# Patient Record
Sex: FEMALE | Race: BLACK OR AFRICAN AMERICAN | NOT HISPANIC OR LATINO | Employment: FULL TIME | URBAN - METROPOLITAN AREA
[De-identification: names, ages, dates, MRNs, and addresses within clinical notes are randomized per-mention and may not be internally consistent; named-entity substitution may affect disease eponyms.]

---

## 2021-05-14 ENCOUNTER — OFFICE VISIT (OUTPATIENT)
Dept: PRIMARY CARE | Facility: CLINIC | Age: 31
End: 2021-05-14
Payer: COMMERCIAL

## 2021-05-14 VITALS
HEART RATE: 70 BPM | SYSTOLIC BLOOD PRESSURE: 110 MMHG | WEIGHT: 143 LBS | OXYGEN SATURATION: 98 % | BODY MASS INDEX: 23.82 KG/M2 | DIASTOLIC BLOOD PRESSURE: 72 MMHG | HEIGHT: 65 IN

## 2021-05-14 DIAGNOSIS — Z00.00 ROUTINE MEDICAL EXAM: Primary | ICD-10-CM

## 2021-05-14 DIAGNOSIS — E55.9 VITAMIN D DEFICIENCY: ICD-10-CM

## 2021-05-14 DIAGNOSIS — Z80.3 FAMILY HISTORY OF BREAST CANCER: ICD-10-CM

## 2021-05-14 PROCEDURE — 3008F BODY MASS INDEX DOCD: CPT | Performed by: NURSE PRACTITIONER

## 2021-05-14 PROCEDURE — 99395 PREV VISIT EST AGE 18-39: CPT | Performed by: NURSE PRACTITIONER

## 2021-05-14 RX ORDER — CRANBERRY FRUIT 450 MG
TABLET ORAL
COMMUNITY
End: 2022-09-09 | Stop reason: ALTCHOICE

## 2021-05-14 RX ORDER — ASCORBIC ACID 250 MG
250 TABLET ORAL DAILY
COMMUNITY
End: 2022-09-09 | Stop reason: ALTCHOICE

## 2021-05-14 RX ORDER — LANOLIN ALCOHOL/MO/W.PET/CERES
1000 CREAM (GRAM) TOPICAL DAILY
COMMUNITY
End: 2022-09-09 | Stop reason: ALTCHOICE

## 2021-05-14 ASSESSMENT — ENCOUNTER SYMPTOMS
SHORTNESS OF BREATH: 0
BRUISES/BLEEDS EASILY: 0
NECK PAIN: 0
LIGHT-HEADEDNESS: 0
DYSURIA: 0
ARTHRALGIAS: 0
FEVER: 0
ABDOMINAL PAIN: 0
TROUBLE SWALLOWING: 0
HEADACHES: 0
ADENOPATHY: 0
DIZZINESS: 0
FREQUENCY: 0
PALPITATIONS: 0
BACK PAIN: 0
COUGH: 0
NECK STIFFNESS: 0
FATIGUE: 0
CHILLS: 0

## 2021-05-14 ASSESSMENT — PATIENT HEALTH QUESTIONNAIRE - PHQ9: SUM OF ALL RESPONSES TO PHQ9 QUESTIONS 1 & 2: 0

## 2021-05-14 NOTE — PROGRESS NOTES
Subjective      Patient ID: Lazara Ferguson is a 31 y.o. female.  1990      Patient presents as a new patient for a physical exam.  Diet is healthy, well-balanced, vegetarian diet, but likes seafood.  She is not formally exercising.  Menses is regular.  Up to date with dental and eye exam.  Sleeps well at night.  She works for the [employer] and working from home.  Lives alone.  History of Vitamin D being low.  Offers no complaints .      The following have been reviewed and updated as appropriate in this visit:  Tobacco  Allergies  Meds  Problems  Med Hx  Surg Hx  Fam Hx  Soc Hx        Review of Systems   Constitutional: Negative for chills, fatigue and fever.   HENT: Negative for trouble swallowing.    Respiratory: Negative for cough and shortness of breath.    Cardiovascular: Negative for chest pain, palpitations and leg swelling.   Gastrointestinal: Negative for abdominal pain.   Genitourinary: Negative for dysuria and frequency.   Musculoskeletal: Negative for arthralgias, back pain, neck pain and neck stiffness.   Skin: Negative for rash.   Neurological: Negative for dizziness, light-headedness and headaches (menstrual migraines; takes OTC excedrin ).   Hematological: Negative for adenopathy. Does not bruise/bleed easily.     There is no problem list on file for this patient.     History reviewed. No pertinent past medical history.  Past Surgical History:   Procedure Laterality Date   • BREAST SURGERY      right side benign tumor removed.      Social History     Socioeconomic History   • Marital status: Single     Spouse name: None   • Number of children: None   • Years of education: None   • Highest education level: None   Occupational History   • None   Tobacco Use   • Smoking status: Never Smoker   • Smokeless tobacco: Never Used   Vaping Use   • Vaping Use: Never used   Substance and Sexual Activity   • Alcohol use: Yes     Comment: social    • Drug use: Never   • Sexual activity: Yes  "    Birth control/protection: Pill   Other Topics Concern   • None   Social History Narrative   • None     Social Determinants of Health     Financial Resource Strain:    • Difficulty of Paying Living Expenses:    Food Insecurity:    • Worried About Running Out of Food in the Last Year:    • Ran Out of Food in the Last Year:    Transportation Needs:    • Lack of Transportation (Medical):    • Lack of Transportation (Non-Medical):    Physical Activity:    • Days of Exercise per Week:    • Minutes of Exercise per Session:    Stress:    • Feeling of Stress :    Social Connections:    • Frequency of Communication with Friends and Family:    • Frequency of Social Gatherings with Friends and Family:    • Attends Spiritism Services:    • Active Member of Clubs or Organizations:    • Attends Club or Organization Meetings:    • Marital Status:    Intimate Partner Violence:    • Fear of Current or Ex-Partner:    • Emotionally Abused:    • Physically Abused:    • Sexually Abused:      Objective     Vitals:    05/14/21 1317   BP: 110/72   Pulse: 70   SpO2: 98%   Weight: 64.9 kg (143 lb)   Height: 1.651 m (5' 5\")     Body mass index is 23.8 kg/m².  Current Outpatient Medications   Medication Sig Dispense Refill   • ascorbic acid (VITAMIN C) 250 mg tablet Take 250 mg by mouth daily.     • BIOTIN-KERATIN ORAL Take by mouth.     • cranberry fruit (CRANBERRY) 450 mg tablet Take by mouth.     • cyanocobalamin (VITAMIN B12) 1,000 mcg tablet Take 1,000 mcg by mouth daily.     • ethinyl estradiol/drospirenone (KIMBER, 28, ORAL) Take by mouth.     • Lactobac no.41/Bifidobact no.7 (PROBIOTIC-10 ORAL) Take by mouth.     • multivitamin capsule Take 1 capsule by mouth daily.       No current facility-administered medications for this visit.       Physical Exam  Constitutional:       Appearance: Normal appearance.   HENT:      Head: Normocephalic and atraumatic.      Right Ear: Tympanic membrane, ear canal and external ear normal.      Left Ear: " Tympanic membrane, ear canal and external ear normal.      Nose: Nose normal.      Mouth/Throat:      Pharynx: Oropharynx is clear.   Eyes:      Extraocular Movements: Extraocular movements intact.      Conjunctiva/sclera: Conjunctivae normal.   Cardiovascular:      Rate and Rhythm: Normal rate and regular rhythm.      Pulses: Normal pulses.      Heart sounds: Normal heart sounds.   Pulmonary:      Effort: Pulmonary effort is normal.      Breath sounds: Normal breath sounds.   Abdominal:      General: Bowel sounds are normal.      Palpations: Abdomen is soft.   Musculoskeletal:         General: Normal range of motion.      Cervical back: Normal range of motion and neck supple.   Skin:     General: Skin is warm and dry.   Neurological:      General: No focal deficit present.      Mental Status: She is alert and oriented to person, place, and time.   Psychiatric:         Mood and Affect: Mood normal.         Behavior: Behavior normal.         Thought Content: Thought content normal.         Judgment: Judgment normal.         Assessment/Plan     Problem List Items Addressed This Visit     None      Visit Diagnoses     Routine medical exam    -  Primary    Routine labs ordered.  Continue to work on regular exercise and healthy diet. Up to date with Tdap and pap.    Relevant Orders    Comprehensive metabolic panel    Lipid panel    CBC    TSH w reflex FT4    Vitamin D deficiency        Will check Vitamin D level.    Relevant Orders    Vitamin D 25 hydroxy    Family history of breast cancer        Refer to genetic counseling.

## 2021-06-16 ENCOUNTER — TELEMEDICINE (OUTPATIENT)
Dept: GENETICS | Age: 31
End: 2021-06-16
Payer: COMMERCIAL

## 2021-06-16 DIAGNOSIS — Z80.3 FHX: BREAST CANCER: ICD-10-CM

## 2021-06-16 DIAGNOSIS — Z71.83 ENCOUNTER FOR NONPROCREATIVE GENETIC COUNSELING: Primary | ICD-10-CM

## 2021-06-16 PROCEDURE — 96040 HC GENETICS COUNSELING SESSIONS-TELEHEALTH: CPT | Performed by: GENETIC COUNSELOR, MS

## 2021-06-16 NOTE — LETTER
06/16/21    FRANCA Mcfadden  [address]  [place] [address]      Dear Dr. Hernandez,    I am writing to confirm that your patient, Lazara Ferguson, received care through my office on 06/16/21. I have enclosed a summary of the care provided to Lazara for your reference.    Please contact me with any questions you may have regarding the visit.    Sincerely,           Emmanuelle Garcia, [facility]      CC: No Recipients

## 2021-06-16 NOTE — PROGRESS NOTES
Patient Name:  Lazara Ferguson  : 1990       Telemedicine Consent:    Prior to commencing the session, Lazara Ferguson provided verbal consent to have genetic counseling via telemedicine using Epic Video Client, which is a telemedicine platform being utilized to provide care during the COVID-19 pandemic. Lazara Ferguson understands the session will be billed to insurance or to them directly if uninsured or if not covered by insurance. Lazara Ferguson was informed that the clinician is the only provider on?the video conference, sessions are not recorded by the clinician, and the patient is not permitted to record the session.?The clinician confirmed identification of patient by name and birthdate, confirmed patient location, support person(s) present, and obtained a callback number in case disconnected.     Indication for Appointment:  Lazara Ferguson presented for genetic counseling and cancer risk assessment via a telemedicine encounter due to a family history of breast cancer. Lazara Ferguson was referred by FRANCA Bradford and presented to the session alone.    Personal History:   Lazara Ferguson is a 31 y.o. female with primary visit diagnosis of Encounter for nonprocreative genetic counseling [Z71.83]. She is of [place] descent; of note, she reports Ashkenazi Religion ancestry in her maternal grandmother's lineage.    Past Medical History:   Diagnosis Date   • Screening for breast cancer     breast ultrasound in  due to mass in right breast      Past Medical History Pertinent Negatives:   Denies History Of: Date Noted   • Disease of thyroid gland 2021   • Fibrocystic breast 2021   • Fibroid 2021   • Screening for colon cancer 2021     Past Surgical History:   Procedure Laterality Date   • Breast biopsy Right     benign per patient report   • Breast surgery Right [id number]    right side benign tumor removed (pathology unavailable for review)   •  "Cervical biopsy  w/ loop electrode excision      due to abnormal PAP     Past Surgical History Pertinent Negatives:   Denies History Of: Date Noted   • Colonoscopy 06/16/2021   • Hysterectomy 06/16/2021   • Oophorectomy 06/16/2021   • Salpingectomy 06/16/2021      Height/Weight: (previously recorded in physician's office)  Height: 1.651 m (5' 5\")  Weight: 64.9 kg (143 lb)    Gynecologic History:  Menarche Age: 9 years  Age at first live birth: Nulliparous  Menopause Status: Pre-Menopause  Use of hormonal contraceptives: Yes (BCPs 2007-present)  Use of fertility medications: No  Use of hormone replacement therapy: No  Use of Tamoxifen/Evista: No    Social History     Tobacco Use   • Smoking status: Never Smoker   • Smokeless tobacco: Never Used   Vaping Use   • Vaping Use: Never used   Substance Use Topics   • Alcohol use: Yes     Comment: social    • Drug use: Never       Family History:  See completed family history in pedigree below.    Genetic Education/Risk Assessment/Counseling:  Information was provided about the relationship between genes and cancer.  The concept of hereditary cancer was defined.  Natural history, risks and inheritance patterns of  cancer-associated genes were reviewed, as related to Lazara Ferguson’s personal and/or family history.  Related psychosocial aspects were discussed.    Discussion of Genetic Testing:  The pros, cons, and limitations of testing for genetic susceptibility were discussed, including but not limited to test options, possible results, potential impact on management, and psychosocial aspects.  There may be limited data on the degree of cancer risk and/or no defined management guidelines associated with some genes.  If applicable, risk assessment models and/or published tables were used to provide a mutation probability estimate. Limitations of assessment were reviewed.    Given the reported personal and/or family history, genetic testing was offered but declined at this " time. Lazara Ferguson does not meet insurance or national criteria for testing, and she declined the $250 self-pay peralta. Lazara Ferguson is planning on asking her mother if she ever underwent genetic testing/if she would be interested in testing first.    Risk Assessment and Management:     In the absence of genetic testing, the cancer risks and guidelines reviewed are based on the personal and/or family history provided. As guidelines continually change and the efficacy of screening for some cancers remains under investigation, Lazara Ferguson is encouraged to review personal and family history with managing physician(s) regularly.  Site of Surveillence Coordinating Provider Recommendation Status   Breast Breast Specialist, Primary Care or Gynecology · Lifetime risk of developing breast cancer was calculated using the Tyrer-Cuzick model and is estimated to be 36.3%.  · Breast cancer risk is dynamic and can increase with age and other factors. A high lifetime risk for developing breast cancer is typically 20-25% or higher.  National Comprehensive Cancer Network (NCCN) guidelines for breast cancer screening include: monthly self-breast examinations, clinical breast examination performed by a physician every 6-12 months and annual mammogram to begin 10 years prior to the youngest breast cancer diagnosis in the family, but not less than age 30. The NCCN also suggests screening with breast MRI as an adjunct to mammogram in the high risk setting, with breast MRI screening beginning 10 years prior to the youngest breast cancer diagnosis in the family, but not less than age 25. As the lifetime risk for breast cancer exceeds 20%, screening, as well as the risks and benefits of risk reduction strategies should be discussed with managing physician(s).   Baseline mammogram has not yet been completed.   Gynecologic Gynecology · Pelvic exam and pap test annually or as directed by physician.       Gastrointestinal  PCP  Gastroenterology · Guidelines for when to begin colorectal cancer screening in average risk individuals vary between age 45 and 50; thus, an appropriate screening plan based on personal factors, such as age, race, and symptoms, as well as family history, should be determined by screening gastroenterologist.    Baseline colonoscopy has not yet been completed.   Skin PCP  Dermatology · Lazaraheath Ferguson is encouraged to practice skin protective behaviors, such as:  · Limit direct sun exposure  · Use sunscreen  · Pursue annual skin screening by a physician         General Management PCP · Annual physical examination is encouraged.  · Adherence to a healthy lifestyle, including body mass index (BMI) <25 obtained through balanced diet and exercise  · Limit intake of alcoholic beverages to less than 1 drink per day (serving equals 1 ounce of liquor, 6 ounces of wine or 8 ounces of beer)  · Not smoking.        Plan:  Cancer risks are based on personal history, as well as on maternal and paternal family histories, mutation status, and other factors.  Relatives are encouraged to consider risk assessment and/or genetic evaluation to derive a risk-appropriate, individualized plan.  Should family member(s) be interested in genetic evaluation, the Cancer Risk Assessment and Genetics Program can provide consultation or help to find a genetic counselor in their area. Testing in relatives may assist in cancer risk assessment.    The information provided reflects current practice guidelines and may change with new medical discoveries/technology/updated personal or family history information.  Lazara Ferguson was confirmed to have understood the aforementioned information and was assisted with decision making as needed.  Informational and supportive resources were provided. Consent was obtained to share chart note(s) with physicians.  Lazara Ferguson plans to discuss the above information with physicians to determine an optimal  risk management plan.    Lazara Ferguson should contact the program with personal/family history updates as this could alter the guidelines provided and/or available test options and/or to inquire about new information specific to this case.  Lazara Ferguson was encouraged to contact the genetics program at [phone] with any questions or concerns and/or to periodically review test options and related insurance coverage.    A total of 30 minutes was spent providing genetic counseling to Lazara Ferguson.

## 2021-06-16 NOTE — LETTER
06/16/21    Lazara Dumontan  [address] [place] [address]    Dear Ms. Ferguson,    Thank you for participating in the [facility]etics Program.  It was a pleasure working with you. Attached is documentation from our discussion(s). It will also be sent to any physicians you indicated.      You may also choose to share this documentation with your family members. Because cancer risk is based on both personal and both maternal and paternal family history factors, as well as mutation status, your relatives are recommended to review their risks and genetic testing options (if applicable) with their own healthcare providers to derive a risk-appropriate, individualized plan.      If you have any questions, concerns, or updates to your personal/family history, please contact the [facility] Risk Assessment and Genetics Program at [phone] to further review your case.    Please see below for your encounter report.    Sincerely,         Emmanuelle Garcia, [facility]        Encounter Note    Telemedicine Consent:    Prior to commencing the session, Lazara Ferguson provided verbal consent to have genetic counseling via telemedicine using Epic Video Client, which is a telemedicine platform being utilized to provide care during the COVID-19 pandemic. Lazara Ferguson understands the session will be billed to insurance or to them directly if uninsured or if not covered by insurance. Lazara Ferguson was informed that the clinician is the only provider on?the video conference, sessions are not recorded by the clinician, and the patient is not permitted to record the session.?The clinician confirmed identification of patient by name and birthdate, confirmed patient location, support person(s) present, and obtained a callback number in case disconnected.     Indication for Appointment:  Lazara Ferguson presented for genetic counseling and cancer risk assessment via a telemedicine encounter  "due to a family history of breast cancer. Lazara Ferguson was referred by FRANCA Bradford and presented to the session alone.    Personal History:   Lazara Ferguson is a 31 y.o. female with primary visit diagnosis of Encounter for nonprocreative genetic counseling [Z71.83]. She is of [place] descent; of note, she reports Ashkenazi [employer] ancestry in her maternal grandmother's lineage.    Past Medical History:   Diagnosis Date   • Screening for breast cancer     breast ultrasound in 2007/2008 due to mass in right breast      Past Medical History Pertinent Negatives:   Denies History Of: Date Noted   • Disease of thyroid gland 06/16/2021   • Fibrocystic breast 06/16/2021   • Fibroid 06/16/2021   • Screening for colon cancer 06/16/2021     Past Surgical History:   Procedure Laterality Date   • Breast biopsy Right 2007/2008    benign per patient report   • Breast surgery Right [id number]    right side benign tumor removed (pathology unavailable for review)   • Cervical biopsy  w/ loop electrode excision      due to abnormal PAP     Past Surgical History Pertinent Negatives:   Denies History Of: Date Noted   • Colonoscopy 06/16/2021   • Hysterectomy 06/16/2021   • Oophorectomy 06/16/2021   • Salpingectomy 06/16/2021      Height/Weight: (previously recorded in physician's office)  Height: 1.651 m (5' 5\")  Weight: 64.9 kg (143 lb)    Gynecologic History:  Menarche Age: 9 years  Age at first live birth: Nulliparous  Menopause Status: Pre-Menopause  Use of hormonal contraceptives: Yes (BCPs 2007-present)  Use of fertility medications: No  Use of hormone replacement therapy: No  Use of Tamoxifen/Evista: No    Social History     Tobacco Use   • Smoking status: Never Smoker   • Smokeless tobacco: Never Used   Vaping Use   • Vaping Use: Never used   Substance Use Topics   • Alcohol use: Yes     Comment: social    • Drug use: Never       Family History:  See completed family history in pedigree below.    Genetic " Education/Risk Assessment/Counseling:  Information was provided about the relationship between genes and cancer.  The concept of hereditary cancer was defined.  Natural history, risks and inheritance patterns of  cancer-associated genes were reviewed, as related to Lazara Ferguson’s personal and/or family history.  Related psychosocial aspects were discussed.    Discussion of Genetic Testing:  The pros, cons, and limitations of testing for genetic susceptibility were discussed, including but not limited to test options, possible results, potential impact on management, and psychosocial aspects.  There may be limited data on the degree of cancer risk and/or no defined management guidelines associated with some genes.  If applicable, risk assessment models and/or published tables were used to provide a mutation probability estimate. Limitations of assessment were reviewed.    Given the reported personal and/or family history, genetic testing was offered but declined at this time. Lazara Ferguson does not meet insurance or national criteria for testing, and she declined the $250 self-pay peralta. Lazara Ferguson is planning on asking her mother if she ever underwent genetic testing/if she would be interested in testing first.    Risk Assessment and Management:     In the absence of genetic testing, the cancer risks and guidelines reviewed are based on the personal and/or family history provided. As guidelines continually change and the efficacy of screening for some cancers remains under investigation, Lazara Ferguson is encouraged to review personal and family history with managing physician(s) regularly.  Site of Surveillence Coordinating Provider Recommendation Status   Breast Breast Specialist, Primary Care or Gynecology · Lifetime risk of developing breast cancer was calculated using the Tyrer-Cuzick model and is estimated to be 36.3%.  · Breast cancer risk is dynamic and can increase with age and other factors. A  high lifetime risk for developing breast cancer is typically 20-25% or higher.  National Comprehensive Cancer Network (NCCN) guidelines for breast cancer screening include: monthly self-breast examinations, clinical breast examination performed by a physician every 6-12 months and annual mammogram to begin 10 years prior to the youngest breast cancer diagnosis in the family, but not less than age 30. The NCCN also suggests screening with breast MRI as an adjunct to mammogram in the high risk setting, with breast MRI screening beginning 10 years prior to the youngest breast cancer diagnosis in the family, but not less than age 25. As the lifetime risk for breast cancer exceeds 20%, screening, as well as the risks and benefits of risk reduction strategies should be discussed with managing physician(s).   Baseline mammogram has not yet been completed.   Gynecologic Gynecology · Pelvic exam and pap test annually or as directed by physician.       Gastrointestinal PCP  Gastroenterology · Guidelines for when to begin colorectal cancer screening in average risk individuals vary between age 45 and 50; thus, an appropriate screening plan based on personal factors, such as age, race, and symptoms, as well as family history, should be determined by screening gastroenterologist.    Baseline colonoscopy has not yet been completed.   Skin PCP  Dermatology · Lazara Ferguson is encouraged to practice skin protective behaviors, such as:  · Limit direct sun exposure  · Use sunscreen  · Pursue annual skin screening by a physician         General Management PCP · Annual physical examination is encouraged.  · Adherence to a healthy lifestyle, including body mass index (BMI) <25 obtained through balanced diet and exercise  · Limit intake of alcoholic beverages to less than 1 drink per day (serving equals 1 ounce of liquor, 6 ounces of wine or 8 ounces of beer)  · Not smoking.        Plan:  Cancer risks are based on personal history, as  well as on maternal and paternal family histories, mutation status, and other factors.  Relatives are encouraged to consider risk assessment and/or genetic evaluation to derive a risk-appropriate, individualized plan.  Should family member(s) be interested in genetic evaluation, the Cancer Risk Assessment and Genetics Program can provide consultation or help to find a genetic counselor in their area. Testing in relatives may assist in cancer risk assessment.    The information provided reflects current practice guidelines and may change with new medical discoveries/technology/updated personal or family history information.  Lazara Ferguson was confirmed to have understood the aforementioned information and was assisted with decision making as needed.  Informational and supportive resources were provided. Consent was obtained to share chart note(s) with physicians.  Lazara Ferguson plans to discuss the above information with physicians to determine an optimal risk management plan.    Lazara Ferguson should contact the program with personal/family history updates as this could alter the guidelines provided and/or available test options and/or to inquire about new information specific to this case.  Lazara Ferguson was encouraged to contact the genetics program at [phone] with any questions or concerns and/or to periodically review test options and related insurance coverage.    A total of 30 minutes was spent providing genetic counseling to Lazara Ferguson.

## 2021-08-16 ENCOUNTER — TELEPHONE (OUTPATIENT)
Dept: PRIMARY CARE | Facility: CLINIC | Age: 31
End: 2021-08-16

## 2021-08-16 NOTE — TELEPHONE ENCOUNTER
Spoke to pt over the phone, pt states her test is scheduled today for 345pm and she will have them send the results here so that we are able to view them. Pt states she is still having symptoms so she assumes test will be positive. Advised to monitor symptoms from home and if they get worse contact office. Pt understands

## 2021-08-16 NOTE — TELEPHONE ENCOUNTER
Patient called to advise she was recently exposed to someone who tested COVID +.  She is being tested today.  She has been having body aches , cough , and chills since yesterday.  She would like a call back to discuss what her next steps should be.

## 2021-08-19 NOTE — TELEPHONE ENCOUNTER
Patient just got +COVID results today.  Some of her symptoms have subsided but not the cough and shortness of breath.  States it is not an emergency but when she comes in from walking the dog she has a hard time breathing also at night. She is requesting a call back.

## 2021-08-19 NOTE — TELEPHONE ENCOUNTER
Spoke to Lazara - she states symtpoms are improving but she has intermittent shortness of breath. I recommended she try to purchase a pulse ox and monitor her oxygen level. If O2 is < 92% she is to go to the ER. She is monitoring her temperature - 99.4F today. She is taking Robitussin OTC which is helping with the cough. I advised her if shortness of breath worsens to go to the ER to r/o pneumonia. Pt. Verbalized understanding and all questions were answered.  I asked her call the office tomorrow with an update on how she is feeling.

## 2021-08-23 NOTE — TELEPHONE ENCOUNTER
Talked to patient and she is doing much better. Shortness of breath and fevers was resolved. Patient only continues with dry cough. Patient continues to monitor and call office if worsening symptoms

## 2021-09-10 ENCOUNTER — OFFICE VISIT (OUTPATIENT)
Dept: PRIMARY CARE | Facility: CLINIC | Age: 31
End: 2021-09-10
Payer: COMMERCIAL

## 2021-09-10 VITALS
DIASTOLIC BLOOD PRESSURE: 80 MMHG | TEMPERATURE: 97.9 F | RESPIRATION RATE: 16 BRPM | OXYGEN SATURATION: 99 % | WEIGHT: 140.2 LBS | BODY MASS INDEX: 23.36 KG/M2 | HEIGHT: 65 IN | SYSTOLIC BLOOD PRESSURE: 128 MMHG | HEART RATE: 81 BPM

## 2021-09-10 DIAGNOSIS — R25.3 EYELID TWITCH: ICD-10-CM

## 2021-09-10 DIAGNOSIS — R05.9 COUGH: ICD-10-CM

## 2021-09-10 DIAGNOSIS — U07.1 COVID-19: Primary | ICD-10-CM

## 2021-09-10 PROCEDURE — 3008F BODY MASS INDEX DOCD: CPT | Performed by: NURSE PRACTITIONER

## 2021-09-10 PROCEDURE — 99213 OFFICE O/P EST LOW 20 MIN: CPT | Performed by: NURSE PRACTITIONER

## 2021-09-10 RX ORDER — ALBUTEROL SULFATE 90 UG/1
INHALANT RESPIRATORY (INHALATION)
Qty: 1 EACH | Refills: 2 | Status: SHIPPED | OUTPATIENT
Start: 2021-09-10 | End: 2023-11-07 | Stop reason: ALTCHOICE

## 2021-09-10 RX ORDER — METHYLPREDNISOLONE 4 MG/1
TABLET ORAL
Qty: 21 TABLET | Refills: 0 | Status: SHIPPED | OUTPATIENT
Start: 2021-09-10 | End: 2021-09-17

## 2021-09-10 RX ORDER — DEXAMETHASONE 4 MG/1
1 TABLET ORAL 2 TIMES DAILY
Qty: 12 G | Refills: 5 | Status: SHIPPED | OUTPATIENT
Start: 2021-09-10 | End: 2023-04-24 | Stop reason: ALTCHOICE

## 2021-09-10 ASSESSMENT — ENCOUNTER SYMPTOMS
HEADACHES: 0
SHORTNESS OF BREATH: 0
COUGH: 1
DIZZINESS: 0
FATIGUE: 0
FEVER: 0

## 2021-09-10 NOTE — PATIENT INSTRUCTIONS
Patient present with cough post covid. Will started Flovent inhaler and medrol dose marlee prescribed. Advised to follow up as needed. Advised patient that eye twicthing is probably related to lack of sleep. Follow up as needed.

## 2021-09-10 NOTE — PROGRESS NOTES
"      Subjective      Patient ID: Lazara Ferguson is a 31 y.o. female.  1990      Covid positive 8/16. Started with chills, and progressed to fevers, vomiting and body aches. Cough started the second week of Covid. States cough has been dry. No relief with robutussin or cough drops.  Eye twitching with right eye started with Covid and has continued.      The following have been reviewed and updated as appropriate in this visit:  Tobacco  Allergies  Meds  Problems  Med Hx  Surg Hx  Fam Hx       Review of Systems   Constitutional: Negative for fatigue and fever.   HENT: Negative for congestion.    Respiratory: Positive for cough. Negative for shortness of breath.    Cardiovascular: Negative for chest pain.   Neurological: Negative for dizziness and headaches.       Objective     Vitals:    09/10/21 1509   BP: 128/80   BP Location: Left upper arm   Patient Position: Sitting   Pulse: 81   Resp: 16   Temp: 36.6 °C (97.9 °F)   TempSrc: Temporal   SpO2: 99%   Weight: 63.6 kg (140 lb 3.2 oz)   Height: 1.651 m (5' 5\")     Body mass index is 23.33 kg/m².    Physical Exam  Vitals and nursing note reviewed.   Constitutional:       General: She is not in acute distress.     Appearance: She is well-developed. She is not diaphoretic.   HENT:      Right Ear: Hearing, tympanic membrane, ear canal and external ear normal.      Left Ear: Hearing, tympanic membrane, ear canal and external ear normal.      Nose: Mucosal edema present. No rhinorrhea.      Right Sinus: No maxillary sinus tenderness or frontal sinus tenderness.      Left Sinus: No maxillary sinus tenderness or frontal sinus tenderness.      Mouth/Throat:      Pharynx: Uvula midline.      Tonsils: No tonsillar exudate. 0 on the right. 0 on the left.   Cardiovascular:      Rate and Rhythm: Normal rate and regular rhythm.      Heart sounds: Normal heart sounds. No murmur heard.   No friction rub. No gallop.    Pulmonary:      Effort: Pulmonary effort is normal. No " respiratory distress.      Breath sounds: Normal breath sounds. No stridor. No decreased breath sounds, wheezing, rhonchi or rales.   Lymphadenopathy:      Head:      Right side of head: No submental, submandibular, tonsillar, preauricular or posterior auricular adenopathy.      Left side of head: No submental, submandibular, tonsillar, preauricular or posterior auricular adenopathy.      Cervical: No cervical adenopathy.   Neurological:      Mental Status: She is alert.         Assessment/Plan   Diagnoses and all orders for this visit:    COVID-19 (Primary)    Cough  -     methylPREDNISolone (MEDROL DOSEPACK) 4 mg tablet; Follow package directions.  -     fluticasone HFA (FLOVENT HFA) 110 mcg/actuation inhaler; Inhale 1 puff 2 (two) times a day. Rinse mouth with water after use to reduce aftertaste and incidence of candidiasis. Do not swallow. For patients not on a ventilator, a spacer is recommended to be used with this medication/inhaler.  -     albuterol HFA (VENTOLIN HFA) 90 mcg/actuation inhaler; Take 1-2 puffs by mouth every 4-6 hours as needed for wheezing and/or SOB    Eyelid twitch      Patient Instructions   Patient present with cough post covid. Will started Flovent inhaler and medrol dose marlee prescribed. Advised to follow up as needed. Advised patient that eye twicthing is probably related to lack of sleep. Follow up as needed.

## 2022-09-09 ENCOUNTER — OFFICE VISIT (OUTPATIENT)
Dept: PRIMARY CARE | Facility: CLINIC | Age: 32
End: 2022-09-09
Payer: COMMERCIAL

## 2022-09-09 VITALS
SYSTOLIC BLOOD PRESSURE: 132 MMHG | HEART RATE: 70 BPM | DIASTOLIC BLOOD PRESSURE: 80 MMHG | WEIGHT: 144.8 LBS | TEMPERATURE: 98.3 F | OXYGEN SATURATION: 98 % | RESPIRATION RATE: 15 BRPM | BODY MASS INDEX: 24.12 KG/M2 | HEIGHT: 65 IN

## 2022-09-09 DIAGNOSIS — R53.83 FATIGUE, UNSPECIFIED TYPE: ICD-10-CM

## 2022-09-09 DIAGNOSIS — R06.09 DYSPNEA ON EXERTION: ICD-10-CM

## 2022-09-09 DIAGNOSIS — Z00.00 ROUTINE MEDICAL EXAM: Primary | ICD-10-CM

## 2022-09-09 DIAGNOSIS — Z23 NEED FOR VACCINATION: ICD-10-CM

## 2022-09-09 DIAGNOSIS — R35.0 URINARY FREQUENCY: ICD-10-CM

## 2022-09-09 PROBLEM — U07.1 COVID-19: Status: RESOLVED | Noted: 2021-09-10 | Resolved: 2022-09-09

## 2022-09-09 PROCEDURE — 3008F BODY MASS INDEX DOCD: CPT | Performed by: NURSE PRACTITIONER

## 2022-09-09 PROCEDURE — 99395 PREV VISIT EST AGE 18-39: CPT | Mod: 25 | Performed by: NURSE PRACTITIONER

## 2022-09-09 PROCEDURE — 90471 IMMUNIZATION ADMIN: CPT | Performed by: NURSE PRACTITIONER

## 2022-09-09 PROCEDURE — 90686 IIV4 VACC NO PRSV 0.5 ML IM: CPT | Performed by: NURSE PRACTITIONER

## 2022-09-09 ASSESSMENT — ENCOUNTER SYMPTOMS
BRUISES/BLEEDS EASILY: 0
ARTHRALGIAS: 0
BACK PAIN: 0
FATIGUE: 1
ADENOPATHY: 0
DIZZINESS: 0
NECK STIFFNESS: 0
LIGHT-HEADEDNESS: 0
FREQUENCY: 1
SHORTNESS OF BREATH: 1
CHILLS: 0
TROUBLE SWALLOWING: 0
ABDOMINAL PAIN: 0
PALPITATIONS: 0
NUMBNESS: 0
NECK PAIN: 0
FEVER: 0

## 2022-09-09 ASSESSMENT — PATIENT HEALTH QUESTIONNAIRE - PHQ9: SUM OF ALL RESPONSES TO PHQ9 QUESTIONS 1 & 2: 0

## 2022-09-09 NOTE — PROGRESS NOTES
Subjective      Patient ID: Lazara Ferguson is a 32 y.o. female.  1990      Patient presents for a phyiscal exam.  Diet is healthy, well-balanced, plant based.  She is active going to the gym.  Up to date with dental and eye exam.  Menses is regular, doing well on OCP.  Sees [facility], sees Dr. Elias.  Sleeps well overall.  She is still working from home for the [employer].     She has been noticed increased urinary frequency and episodes of low blood sugar after exercising or prolonged standing.     She also still has some dyspnea on exertion requiring use of flovent 1-2 times a  Week after having COVID last year.  Notices mostly when climbing the steps.       The following have been reviewed and updated as appropriate in this visit:   Tobacco  Allergies  Meds  Problems  Med Hx  Surg Hx  Fam Hx  Soc   Hx        Review of Systems   Constitutional: Positive for fatigue. Negative for chills and fever.   HENT: Negative for trouble swallowing.    Respiratory: Positive for shortness of breath.    Cardiovascular: Negative for chest pain, palpitations and leg swelling.   Gastrointestinal: Negative for abdominal pain.   Genitourinary: Positive for frequency.   Musculoskeletal: Negative for arthralgias, back pain, neck pain and neck stiffness.   Skin: Negative for rash.   Neurological: Negative for dizziness, light-headedness and numbness.   Hematological: Negative for adenopathy. Does not bruise/bleed easily.     Patient Active Problem List   Diagnosis   (none) - all problems resolved or deleted      Past Medical History:   Diagnosis Date    Screening for breast cancer     breast ultrasound in 2007/2008 due to mass in right breast     Past Surgical History:   Procedure Laterality Date    BREAST BIOPSY Right 2007/2008    benign per patient report    BREAST SURGERY Right [id number]    right side benign tumor removed (pathology unavailable for review)    CERVICAL BIOPSY  W/ LOOP ELECTRODE  "EXCISION      due to abnormal PAP     Social History     Socioeconomic History    Marital status: Single     Spouse name: None    Number of children: None    Years of education: None    Highest education level: None   Tobacco Use    Smoking status: Never Smoker    Smokeless tobacco: Never Used   Vaping Use    Vaping Use: Never used   Substance and Sexual Activity    Alcohol use: Yes     Comment: social     Drug use: Never    Sexual activity: Yes     Birth control/protection: Pill     Objective     Vitals:    09/09/22 0840   BP: 132/80   Pulse: 70   Resp: 15   Temp: 36.8 °C (98.3 °F)   SpO2: 98%   Weight: 65.7 kg (144 lb 12.8 oz)   Height: 1.651 m (5' 5\")     Body mass index is 24.1 kg/m².  Current Outpatient Medications   Medication Sig Dispense Refill    albuterol HFA (VENTOLIN HFA) 90 mcg/actuation inhaler Take 1-2 puffs by mouth every 4-6 hours as needed for wheezing and/or SOB 1 each 2    ethinyl estradiol/drospirenone (KIMBER, 28, ORAL) Take by mouth.      fluticasone HFA (FLOVENT HFA) 110 mcg/actuation inhaler Inhale 1 puff 2 (two) times a day. Rinse mouth with water after use to reduce aftertaste and incidence of candidiasis. Do not swallow. For patients not on a ventilator, a spacer is recommended to be used with this medication/inhaler. 12 g 5     No current facility-administered medications for this visit.       Physical Exam  Constitutional:       Appearance: Normal appearance.   HENT:      Head: Normocephalic and atraumatic.      Right Ear: Tympanic membrane, ear canal and external ear normal.      Left Ear: Tympanic membrane, ear canal and external ear normal.      Nose: Nose normal.      Mouth/Throat:      Pharynx: Oropharynx is clear.   Eyes:      Extraocular Movements: Extraocular movements intact.      Conjunctiva/sclera: Conjunctivae normal.   Cardiovascular:      Rate and Rhythm: Normal rate and regular rhythm.      Pulses: Normal pulses.      Heart sounds: Normal heart sounds. "   Pulmonary:      Effort: Pulmonary effort is normal.      Breath sounds: Normal breath sounds.   Abdominal:      General: Bowel sounds are normal.      Palpations: Abdomen is soft.      Tenderness: There is no abdominal tenderness.   Musculoskeletal:         General: Normal range of motion.      Cervical back: Normal range of motion and neck supple.   Skin:     General: Skin is warm and dry.   Neurological:      General: No focal deficit present.      Mental Status: She is alert and oriented to person, place, and time.   Psychiatric:         Mood and Affect: Mood normal.         Behavior: Behavior normal.         Thought Content: Thought content normal.         Judgment: Judgment normal.         Assessment/Plan     Problem List Items Addressed This Visit    None     Visit Diagnoses     Routine medical exam    -  Primary    Continue healthy diet and regular exercise. Routine labs ordered.     Relevant Orders    Comprehensive metabolic panel    Lipid panel    CBC    TSH w reflex FT4    Hemoglobin A1c    Influenza vaccine quadrivalent preservative free 6 mon and older IM (FluLaval) (Completed)    Urinary frequency        Relevant Orders    Hemoglobin A1c    Urinalysis with Reflex Culture    Fatigue, unspecified type        Relevant Orders    Vitamin D 25 hydroxy    Vitamin B12    Need for vaccination        Flu vaccine administered.     Relevant Orders    Influenza vaccine quadrivalent preservative free 6 mon and older IM (FluLaval) (Completed)    Dyspnea on exertion        Given ongoing dyspnea on exertion after COVID, recommend pulmonary evaluation .

## 2022-09-13 ENCOUNTER — TELEPHONE (OUTPATIENT)
Dept: PRIMARY CARE | Facility: CLINIC | Age: 32
End: 2022-09-13

## 2022-09-13 LAB
25(OH)D3+25(OH)D2 SERPL-MCNC: 38.3 NG/ML (ref 30–100)
ALBUMIN SERPL-MCNC: 4.4 G/DL (ref 3.8–4.8)
ALBUMIN/GLOB SERPL: 1.4 {RATIO} (ref 1.2–2.2)
ALP SERPL-CCNC: 57 IU/L (ref 44–121)
ALT SERPL-CCNC: 11 IU/L (ref 0–32)
AST SERPL-CCNC: 25 IU/L (ref 0–40)
BILIRUB SERPL-MCNC: 0.3 MG/DL (ref 0–1.2)
BUN SERPL-MCNC: 8 MG/DL (ref 6–20)
BUN/CREAT SERPL: 10 (ref 9–23)
CALCIUM SERPL-MCNC: 9.6 MG/DL (ref 8.7–10.2)
CHLORIDE SERPL-SCNC: 103 MMOL/L (ref 96–106)
CHOLEST SERPL-MCNC: 186 MG/DL (ref 100–199)
CO2 SERPL-SCNC: 23 MMOL/L (ref 20–29)
CREAT SERPL-MCNC: 0.82 MG/DL (ref 0.57–1)
EGFRCR-CYS SERPLBLD CKD-EPI 2021: 96 ML/MIN/1.73
ERYTHROCYTE [DISTWIDTH] IN BLOOD BY AUTOMATED COUNT: 12.1 % (ref 11.7–15.4)
GLOBULIN SER CALC-MCNC: 3.1 G/DL (ref 1.5–4.5)
GLUCOSE SERPL-MCNC: 94 MG/DL (ref 65–99)
HBA1C MFR BLD: 5.3 % (ref 4.8–5.6)
HCT VFR BLD AUTO: 35.5 % (ref 34–46.6)
HDLC SERPL-MCNC: 55 MG/DL
HGB BLD-MCNC: 12.2 G/DL (ref 11.1–15.9)
LDLC SERPL CALC-MCNC: 110 MG/DL (ref 0–99)
MCH RBC QN AUTO: 30.7 PG (ref 26.6–33)
MCHC RBC AUTO-ENTMCNC: 34.4 G/DL (ref 31.5–35.7)
MCV RBC AUTO: 89 FL (ref 79–97)
PLATELET # BLD AUTO: 293 X10E3/UL (ref 150–450)
POTASSIUM SERPL-SCNC: 4.5 MMOL/L (ref 3.5–5.2)
PROT SERPL-MCNC: 7.5 G/DL (ref 6–8.5)
RBC # BLD AUTO: 3.98 X10E6/UL (ref 3.77–5.28)
SODIUM SERPL-SCNC: 139 MMOL/L (ref 134–144)
T4 FREE SERPL-MCNC: 1.05 NG/DL (ref 0.82–1.77)
TRIGL SERPL-MCNC: 118 MG/DL (ref 0–149)
TSH SERPL DL<=0.005 MIU/L-ACNC: 0.67 UIU/ML (ref 0.45–4.5)
VIT B12 SERPL-MCNC: 432 PG/ML (ref 232–1245)
VLDLC SERPL CALC-MCNC: 21 MG/DL (ref 5–40)
WBC # BLD AUTO: 3.4 X10E3/UL (ref 3.4–10.8)

## 2022-09-13 NOTE — TELEPHONE ENCOUNTER
Test Result Request  Patient PCP: Adenike Hrenandez CRNPOrdering Provider: Mary SCHULTE  Test Name: annual blood work  Testing Location: [facility]  Test Date: 9/12    Test results may have been made available for review in [employer] before your provider has had a chance to review and discuss the results with you. Please allow time for your provider to review your results.

## 2022-09-14 LAB
APPEARANCE UR: CLEAR
BACTERIA #/AREA URNS HPF: ABNORMAL /[HPF]
BACTERIA UR CULT: NO GROWTH
BACTERIA UR CULT: NORMAL
BILIRUB UR QL STRIP: NEGATIVE
CASTS URNS QL MICRO: ABNORMAL /LPF
COLOR UR: YELLOW
EPI CELLS #/AREA URNS HPF: >10 /HPF (ref 0–10)
GLUCOSE UR QL STRIP: NEGATIVE
HGB UR QL STRIP: NEGATIVE
KETONES UR QL STRIP: ABNORMAL
LAB CORP URINALYSIS REFLEX: ABNORMAL
LEUKOCYTE ESTERASE UR QL STRIP: NEGATIVE
MICRO URNS: ABNORMAL
MICRO URNS: ABNORMAL
NITRITE UR QL STRIP: NEGATIVE
PH UR STRIP: 5.5 [PH] (ref 5–7.5)
PROT UR QL STRIP: ABNORMAL
RBC #/AREA URNS HPF: ABNORMAL /HPF (ref 0–2)
SP GR UR STRIP: 1.03 (ref 1–1.03)
UROBILINOGEN UR STRIP-MCNC: 0.2 MG/DL (ref 0.2–1)
WBC #/AREA URNS HPF: >30 /HPF (ref 0–5)

## 2023-04-24 ENCOUNTER — TELEPHONE (OUTPATIENT)
Dept: PRIMARY CARE | Facility: CLINIC | Age: 33
End: 2023-04-24

## 2023-04-24 ENCOUNTER — OFFICE VISIT (OUTPATIENT)
Dept: PRIMARY CARE | Facility: CLINIC | Age: 33
End: 2023-04-24
Payer: COMMERCIAL

## 2023-04-24 ENCOUNTER — HOSPITAL ENCOUNTER (OUTPATIENT)
Dept: RADIOLOGY | Facility: CLINIC | Age: 33
Discharge: HOME | End: 2023-04-24
Attending: NURSE PRACTITIONER
Payer: COMMERCIAL

## 2023-04-24 VITALS
DIASTOLIC BLOOD PRESSURE: 70 MMHG | WEIGHT: 146.2 LBS | HEIGHT: 65 IN | SYSTOLIC BLOOD PRESSURE: 114 MMHG | OXYGEN SATURATION: 99 % | BODY MASS INDEX: 24.36 KG/M2 | TEMPERATURE: 97.3 F | RESPIRATION RATE: 18 BRPM | HEART RATE: 66 BPM

## 2023-04-24 DIAGNOSIS — M25.561 ACUTE PAIN OF RIGHT KNEE: ICD-10-CM

## 2023-04-24 DIAGNOSIS — M25.561 ACUTE PAIN OF RIGHT KNEE: Primary | ICD-10-CM

## 2023-04-24 PROCEDURE — 3008F BODY MASS INDEX DOCD: CPT | Performed by: NURSE PRACTITIONER

## 2023-04-24 PROCEDURE — 73564 X-RAY EXAM KNEE 4 OR MORE: CPT | Mod: RT

## 2023-04-24 PROCEDURE — 99213 OFFICE O/P EST LOW 20 MIN: CPT | Performed by: NURSE PRACTITIONER

## 2023-04-24 RX ORDER — NAPROXEN 500 MG/1
500 TABLET ORAL 2 TIMES DAILY WITH MEALS
Qty: 30 TABLET | Refills: 0 | Status: SHIPPED | OUTPATIENT
Start: 2023-04-24 | End: 2023-11-07 | Stop reason: ALTCHOICE

## 2023-04-24 ASSESSMENT — ENCOUNTER SYMPTOMS
FEVER: 0
DIZZINESS: 0
DIAPHORESIS: 0
TROUBLE SWALLOWING: 0
BRUISES/BLEEDS EASILY: 0
LIGHT-HEADEDNESS: 0
COUGH: 0
HEADACHES: 0
SHORTNESS OF BREATH: 0
PALPITATIONS: 0
FATIGUE: 0
ADENOPATHY: 0

## 2023-04-24 NOTE — TELEPHONE ENCOUNTER
Patient called with complaints of swollen knee for 1 week that worsened Sunday Call transferred to [facility] for triage.

## 2023-04-24 NOTE — PROGRESS NOTES
Subjective      Patient ID: Lazara Ferguson is a 33 y.o. female.  1990      Patient presents for right knee pain.  She reports last week she was doing squats with weights and heard a tear in right knee.  She did not have any pain so she continued to exercise.  Then she noticed some swelling throughout the week but continued to exercise.  She reports then yesterday she did a lot of walking and afterwards her right knee was very swollen and she is limping due to pain.  She reports pain is 3/10 and she did try ice and elevation.         The following have been reviewed and updated as appropriate in this visit:   Tobacco  Allergies  Meds  Med Hx  Surg Hx  Fam Hx  Soc Hx      Review of Systems   Constitutional: Negative for diaphoresis, fatigue and fever.   HENT: Negative for trouble swallowing.    Respiratory: Negative for cough and shortness of breath.    Cardiovascular: Negative for chest pain and palpitations.   Skin: Negative for rash.   Neurological: Negative for dizziness, light-headedness and headaches.   Hematological: Negative for adenopathy. Does not bruise/bleed easily.     Patient Active Problem List   Diagnosis   (none) - all problems resolved or deleted      Past Medical History:   Diagnosis Date   • Screening for breast cancer     breast ultrasound in 2007/2008 due to mass in right breast     Past Surgical History:   Procedure Laterality Date   • BREAST BIOPSY Right 2007/2008    benign per patient report   • BREAST SURGERY Right [id number]    right side benign tumor removed (pathology unavailable for review)   • CERVICAL BIOPSY  W/ LOOP ELECTRODE EXCISION      due to abnormal PAP     Social History     Socioeconomic History   • Marital status: Single     Spouse name: None   • Number of children: None   • Years of education: None   • Highest education level: None   Tobacco Use   • Smoking status: Never   • Smokeless tobacco: Never   Vaping Use   • Vaping status: Never Used   Substance and Sexual  "Activity   • Alcohol use: Yes     Comment: social    • Drug use: Never   • Sexual activity: Yes     Birth control/protection: Pill     Objective     Vitals:    04/24/23 1059   BP: 114/70   BP Location: Right upper arm   Patient Position: Sitting   Pulse: 66   Resp: 18   Temp: 36.3 °C (97.3 °F)   TempSrc: Temporal   SpO2: 99%   Weight: 66.3 kg (146 lb 3.2 oz)   Height: 1.651 m (5' 5\")     Body mass index is 24.33 kg/m².  Current Outpatient Medications   Medication Sig Dispense Refill   • albuterol HFA (VENTOLIN HFA) 90 mcg/actuation inhaler Take 1-2 puffs by mouth every 4-6 hours as needed for wheezing and/or SOB 1 each 2   • ethinyl estradiol/drospirenone (KIMBER, 28, ORAL) Take by mouth.     • naproxen (NAPROSYN) 500 mg tablet Take 1 tablet (500 mg total) by mouth 2 (two) times a day with meals. 30 tablet 0     No current facility-administered medications for this visit.       Physical Exam  Constitutional:       Appearance: Normal appearance.   HENT:      Head: Normocephalic and atraumatic.   Eyes:      Extraocular Movements: Extraocular movements intact.      Conjunctiva/sclera: Conjunctivae normal.   Musculoskeletal:      Cervical back: Normal range of motion and neck supple.      Right knee: Swelling present. Decreased range of motion. Tenderness present over the medial joint line.   Neurological:      General: No focal deficit present.      Mental Status: She is alert and oriented to person, place, and time.   Psychiatric:         Mood and Affect: Mood normal.         Behavior: Behavior normal.         Thought Content: Thought content normal.         Judgment: Judgment normal.         Assessment/Plan     Problem List Items Addressed This Visit    None  Visit Diagnoses     Acute pain of right knee    -  Primary    Relevant Orders    X-RAY KNEE RIGHT 4+ VIEWS    MRI KNEE RIGHT WITHOUT CONTRAST         Will check xray of knee and proceed with MRI.  Recommend RICE Therapy and will also start on naproxen. May need to " see ortho if no improvement.

## 2023-04-27 ENCOUNTER — TELEPHONE (OUTPATIENT)
Dept: PRIMARY CARE | Facility: CLINIC | Age: 33
End: 2023-04-27
Payer: COMMERCIAL

## 2023-04-27 NOTE — TELEPHONE ENCOUNTER
[employer] sent Denial for for MRI of the knee. I    After review of Xray, office notes.    Patient would need a detailed knee exam that shows  - a compression test  - knee lock without straightening during exam    - 6 weeks of treatment       Call Reference number is - [id number]    [facility] Provider line [phone] Option 2

## 2023-04-27 NOTE — TELEPHONE ENCOUNTER
Spoke with patient and she is aware the MRI is denied, she states with the medications, the swelling is down and she is walking better so she is feeling better. Aware to fup with ortho if pain/swelling reoccurs for further work up

## 2023-06-13 ENCOUNTER — TELEPHONE (OUTPATIENT)
Dept: PRIMARY CARE | Facility: CLINIC | Age: 33
End: 2023-06-13
Payer: COMMERCIAL

## 2023-06-13 NOTE — TELEPHONE ENCOUNTER
Patient called for recommendations for an Orthopedic surgeon. Pt stated that she was already seen by PCP on 4/24/2023. Pt also already did x-ray, results were negative, but pt still has pain and needs orthopedic surgeon and wants MRI and PT. Please advise.

## 2023-06-14 DIAGNOSIS — M25.561 ACUTE PAIN OF RIGHT KNEE: Primary | ICD-10-CM

## 2023-06-14 NOTE — TELEPHONE ENCOUNTER
Spoke with pt and she is aware PT has been ordered for her. Info for Ortho sent via [employer]. She is aware MRI was denied by insurance

## 2023-11-07 ENCOUNTER — OFFICE VISIT (OUTPATIENT)
Dept: PRIMARY CARE | Facility: CLINIC | Age: 33
End: 2023-11-07
Payer: COMMERCIAL

## 2023-11-07 VITALS
TEMPERATURE: 97.7 F | HEART RATE: 97 BPM | RESPIRATION RATE: 18 BRPM | SYSTOLIC BLOOD PRESSURE: 108 MMHG | HEIGHT: 65 IN | DIASTOLIC BLOOD PRESSURE: 74 MMHG | WEIGHT: 148 LBS | OXYGEN SATURATION: 99 % | BODY MASS INDEX: 24.66 KG/M2

## 2023-11-07 DIAGNOSIS — Z00.00 ROUTINE MEDICAL EXAM: Primary | ICD-10-CM

## 2023-11-07 DIAGNOSIS — U09.9 POST-COVID CHRONIC LOSS OF SMELL AND TASTE: ICD-10-CM

## 2023-11-07 DIAGNOSIS — Z23 NEEDS FLU SHOT: ICD-10-CM

## 2023-11-07 DIAGNOSIS — R43.8 POST-COVID CHRONIC LOSS OF SMELL AND TASTE: ICD-10-CM

## 2023-11-07 PROCEDURE — 99395 PREV VISIT EST AGE 18-39: CPT | Mod: 25 | Performed by: NURSE PRACTITIONER

## 2023-11-07 PROCEDURE — 90686 IIV4 VACC NO PRSV 0.5 ML IM: CPT | Performed by: NURSE PRACTITIONER

## 2023-11-07 PROCEDURE — 90471 IMMUNIZATION ADMIN: CPT | Performed by: NURSE PRACTITIONER

## 2023-11-07 PROCEDURE — 3008F BODY MASS INDEX DOCD: CPT | Performed by: NURSE PRACTITIONER

## 2023-11-07 RX ORDER — MAGNESIUM 200 MG
TABLET ORAL
COMMUNITY

## 2023-11-07 ASSESSMENT — ENCOUNTER SYMPTOMS
NECK STIFFNESS: 0
ARTHRALGIAS: 0
PALPITATIONS: 0
LIGHT-HEADEDNESS: 0
DIZZINESS: 0
FATIGUE: 0
NECK PAIN: 0
HEADACHES: 1
BACK PAIN: 0
FEVER: 0
SHORTNESS OF BREATH: 0
CHILLS: 0
DYSURIA: 0
ABDOMINAL PAIN: 0
COUGH: 0
TROUBLE SWALLOWING: 0
BRUISES/BLEEDS EASILY: 0
ADENOPATHY: 0

## 2023-11-07 ASSESSMENT — PATIENT HEALTH QUESTIONNAIRE - PHQ9: SUM OF ALL RESPONSES TO PHQ9 QUESTIONS 1 & 2: 0

## 2023-11-07 NOTE — PROGRESS NOTES
Subjective      Patient ID: Lazara Ferguson is a 33 y.o. female.  1990      Patient presents for a phyiscal exam.  Diet is healthy, well-balanced, plant based.  She is active going to the gym twice a week.  Up to date with dental and eye exam.  Menses is regular, doing well on OCP.  Sees [facility], sees Dr. Elias and also gets mammogram yearly.  Sleeps well overall.  She is still working from home for the [employer] and at [employer].      She has noticed over the past week she has been getting a shooting, electric pain on the left side of her head. Episodes last about 30 seconds and only occurs once a day. She does see a chiro once a week.  Denies fever or chills, visual changes.    She also reports she has loss of taste/smell since having COVID over 1 year ago.             The following have been reviewed and updated as appropriate in this visit:   Tobacco  Allergies  Meds  Problems  Med Hx  Surg Hx  Fam Hx  Soc   Hx      Review of Systems   Constitutional: Negative for chills, fatigue and fever.   HENT: Negative for trouble swallowing.    Respiratory: Negative for cough and shortness of breath.    Cardiovascular: Negative for chest pain, palpitations and leg swelling.   Gastrointestinal: Negative for abdominal pain.   Genitourinary: Negative for dysuria.   Musculoskeletal: Negative for arthralgias, back pain, neck pain and neck stiffness.   Skin: Negative for rash.   Neurological: Positive for headaches. Negative for dizziness and light-headedness.   Hematological: Negative for adenopathy. Does not bruise/bleed easily.     Patient Active Problem List   Diagnosis   (none) - all problems resolved or deleted      Past Medical History:   Diagnosis Date    Screening for breast cancer     breast ultrasound in 2007/2008 due to mass in right breast     Past Surgical History:   Procedure Laterality Date    BREAST BIOPSY Right 2007/2008    benign per patient report    BREAST SURGERY Right  "[id number]    right side benign tumor removed (pathology unavailable for review)    CERVICAL BIOPSY  W/ LOOP ELECTRODE EXCISION      due to abnormal PAP     Social History     Socioeconomic History    Marital status: Single     Spouse name: None    Number of children: None    Years of education: None    Highest education level: None   Tobacco Use    Smoking status: Never    Smokeless tobacco: Never   Vaping Use    Vaping Use: Never used   Substance and Sexual Activity    Alcohol use: Yes     Comment: social     Drug use: Never    Sexual activity: Yes     Birth control/protection: Pill     Objective     Vitals:    11/07/23 0839   BP: 108/74   BP Location: Right upper arm   Patient Position: Sitting   Pulse: 97   Resp: 18   Temp: 36.5 °C (97.7 °F)   TempSrc: Temporal   SpO2: 99%   Weight: 67.1 kg (148 lb)   Height: 1.651 m (5' 5\")     Body mass index is 24.63 kg/m².  Current Outpatient Medications   Medication Sig Dispense Refill    ethinyl estradiol/drospirenone (KIMBER, 28, ORAL) Take by mouth.      magnesium 200 mg tablet Take by mouth.       No current facility-administered medications for this visit.       Physical Exam  Constitutional:       Appearance: Normal appearance.   HENT:      Head: Normocephalic and atraumatic.      Right Ear: Tympanic membrane, ear canal and external ear normal.      Left Ear: Tympanic membrane, ear canal and external ear normal.      Nose: Nose normal.      Mouth/Throat:      Pharynx: Oropharynx is clear.   Eyes:      Extraocular Movements: Extraocular movements intact.      Conjunctiva/sclera: Conjunctivae normal.   Cardiovascular:      Rate and Rhythm: Normal rate and regular rhythm.      Pulses: Normal pulses.      Heart sounds: Normal heart sounds.   Pulmonary:      Effort: Pulmonary effort is normal.      Breath sounds: Normal breath sounds.   Abdominal:      General: Bowel sounds are normal.      Palpations: Abdomen is soft.   Musculoskeletal:         General: Normal " range of motion.      Cervical back: Normal range of motion and neck supple.   Skin:     General: Skin is warm and dry.   Neurological:      General: No focal deficit present.      Mental Status: She is alert and oriented to person, place, and time.   Psychiatric:         Mood and Affect: Mood normal.         Behavior: Behavior normal.         Thought Content: Thought content normal.         Judgment: Judgment normal.         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 Continue healthy diet/exercise. Up to date with mammo/pap.  Flu vaccine administered.  Can try smell therapy.   She will monitor her headache.

## 2023-11-08 DIAGNOSIS — R79.89 ABNORMAL TSH: Primary | ICD-10-CM

## 2023-11-08 LAB
25(OH)D3+25(OH)D2 SERPL-MCNC: 50.9 NG/ML (ref 30–100)
ALBUMIN SERPL-MCNC: 4.7 G/DL (ref 3.9–4.9)
ALBUMIN/GLOB SERPL: 1.5 {RATIO} (ref 1.2–2.2)
ALP SERPL-CCNC: 54 IU/L (ref 44–121)
ALT SERPL-CCNC: 7 IU/L (ref 0–32)
AST SERPL-CCNC: 16 IU/L (ref 0–40)
BILIRUB SERPL-MCNC: 0.3 MG/DL (ref 0–1.2)
BUN SERPL-MCNC: 7 MG/DL (ref 6–20)
BUN/CREAT SERPL: 8 (ref 9–23)
CALCIUM SERPL-MCNC: 9.8 MG/DL (ref 8.7–10.2)
CHLORIDE SERPL-SCNC: 101 MMOL/L (ref 96–106)
CHOLEST SERPL-MCNC: 192 MG/DL (ref 100–199)
CO2 SERPL-SCNC: 20 MMOL/L (ref 20–29)
CREAT SERPL-MCNC: 0.84 MG/DL (ref 0.57–1)
EGFRCR SERPLBLD CKD-EPI 2021: 93 ML/MIN/1.73
ERYTHROCYTE [DISTWIDTH] IN BLOOD BY AUTOMATED COUNT: 11.9 % (ref 11.7–15.4)
GLOBULIN SER CALC-MCNC: 3.1 G/DL (ref 1.5–4.5)
GLUCOSE SERPL-MCNC: 87 MG/DL (ref 70–99)
HBA1C MFR BLD: 5.4 % (ref 4.8–5.6)
HCT VFR BLD AUTO: 35.4 % (ref 34–46.6)
HDLC SERPL-MCNC: 64 MG/DL
HGB BLD-MCNC: 12.1 G/DL (ref 11.1–15.9)
LDLC SERPL CALC-MCNC: 109 MG/DL (ref 0–99)
MAGNESIUM SERPL-MCNC: 2.1 MG/DL (ref 1.6–2.3)
MCH RBC QN AUTO: 30.9 PG (ref 26.6–33)
MCHC RBC AUTO-ENTMCNC: 34.2 G/DL (ref 31.5–35.7)
MCV RBC AUTO: 91 FL (ref 79–97)
PLATELET # BLD AUTO: 341 X10E3/UL (ref 150–450)
POTASSIUM SERPL-SCNC: 4.6 MMOL/L (ref 3.5–5.2)
PROT SERPL-MCNC: 7.8 G/DL (ref 6–8.5)
RBC # BLD AUTO: 3.91 X10E6/UL (ref 3.77–5.28)
SODIUM SERPL-SCNC: 139 MMOL/L (ref 134–144)
T4 FREE SERPL-MCNC: 1.11 NG/DL (ref 0.82–1.77)
TRIGL SERPL-MCNC: 109 MG/DL (ref 0–149)
TSH SERPL DL<=0.005 MIU/L-ACNC: 0.38 UIU/ML (ref 0.45–4.5)
VIT B12 SERPL-MCNC: 627 PG/ML (ref 232–1245)
VLDLC SERPL CALC-MCNC: 19 MG/DL (ref 5–40)
WBC # BLD AUTO: 4.2 X10E3/UL (ref 3.4–10.8)

## 2023-11-13 ENCOUNTER — HOSPITAL ENCOUNTER (EMERGENCY)
Facility: HOSPITAL | Age: 33
Discharge: HOME | End: 2023-11-13
Attending: EMERGENCY MEDICINE
Payer: COMMERCIAL

## 2023-11-13 VITALS
WEIGHT: 178 LBS | HEART RATE: 71 BPM | SYSTOLIC BLOOD PRESSURE: 124 MMHG | BODY MASS INDEX: 29.66 KG/M2 | DIASTOLIC BLOOD PRESSURE: 66 MMHG | HEIGHT: 65 IN | TEMPERATURE: 97.9 F | OXYGEN SATURATION: 98 % | RESPIRATION RATE: 18 BRPM

## 2023-11-13 DIAGNOSIS — R10.2 PELVIC CRAMPING: Primary | ICD-10-CM

## 2023-11-13 LAB
ANION GAP SERPL CALC-SCNC: 8 MEQ/L (ref 3–15)
BACTERIA URNS QL MICRO: 1 /HPF
BASOPHILS # BLD: 0.02 K/UL (ref 0.01–0.1)
BASOPHILS NFR BLD: 0.4 %
BILIRUB UR QL STRIP.AUTO: NEGATIVE MG/DL
BUN SERPL-MCNC: 8 MG/DL (ref 7–25)
CALCIUM SERPL-MCNC: 9.6 MG/DL (ref 8.6–10.3)
CHLORIDE SERPL-SCNC: 103 MEQ/L (ref 98–107)
CLARITY UR REFRACT.AUTO: ABNORMAL
CO2 SERPL-SCNC: 26 MEQ/L (ref 21–31)
COLOR UR AUTO: YELLOW
CREAT SERPL-MCNC: 0.7 MG/DL (ref 0.6–1.2)
DIFFERENTIAL METHOD BLD: ABNORMAL
EOSINOPHIL # BLD: 0.01 K/UL (ref 0.04–0.36)
EOSINOPHIL NFR BLD: 0.2 %
ERYTHROCYTE [DISTWIDTH] IN BLOOD BY AUTOMATED COUNT: 12 % (ref 11.7–14.4)
GFR SERPL CREATININE-BSD FRML MDRD: >60 ML/MIN/1.73M*2
GLUCOSE SERPL-MCNC: 132 MG/DL (ref 70–99)
GLUCOSE UR STRIP.AUTO-MCNC: NEGATIVE MG/DL
HCG UR QL: NEGATIVE
HCT VFR BLDCO AUTO: 35 % (ref 35–45)
HGB BLD-MCNC: 11.5 G/DL (ref 11.8–15.7)
HGB UR QL STRIP.AUTO: 3
HYALINE CASTS #/AREA URNS LPF: ABNORMAL /LPF
IMM GRANULOCYTES # BLD AUTO: 0.01 K/UL (ref 0–0.08)
IMM GRANULOCYTES NFR BLD AUTO: 0.2 %
KETONES UR STRIP.AUTO-MCNC: NEGATIVE MG/DL
LEUKOCYTE ESTERASE UR QL STRIP.AUTO: ABNORMAL
LYMPHOCYTES # BLD: 1.75 K/UL (ref 1.2–3.5)
LYMPHOCYTES NFR BLD: 38.7 %
MCH RBC QN AUTO: 30.5 PG (ref 28–33.2)
MCHC RBC AUTO-ENTMCNC: 32.9 G/DL (ref 32.2–35.5)
MCV RBC AUTO: 92.8 FL (ref 83–98)
MONOCYTES # BLD: 0.2 K/UL (ref 0.28–0.8)
MONOCYTES NFR BLD: 4.4 %
MUCOUS THREADS URNS QL MICRO: 3 /LPF
NEUTROPHILS # BLD: 2.53 K/UL (ref 1.7–7)
NEUTS SEG NFR BLD: 56.1 %
NITRITE UR QL STRIP.AUTO: NEGATIVE
NRBC BLD-RTO: 0 %
PDW BLD AUTO: 10 FL (ref 9.4–12.3)
PH UR STRIP.AUTO: 6.5 [PH]
PLATELET # BLD AUTO: 288 K/UL (ref 150–369)
POTASSIUM SERPL-SCNC: 3.4 MEQ/L (ref 3.5–5.1)
PROT UR QL STRIP.AUTO: 2
RBC # BLD AUTO: 3.77 M/UL (ref 3.93–5.22)
RBC #/AREA URNS HPF: ABNORMAL /HPF
SODIUM SERPL-SCNC: 137 MEQ/L (ref 136–145)
SP GR UR REFRACT.AUTO: 1.03
SQUAMOUS URNS QL MICRO: 2 /HPF
UROBILINOGEN UR STRIP-ACNC: 0.2 EU/DL
WBC # BLD AUTO: 4.52 K/UL (ref 3.8–10.5)
WBC #/AREA URNS HPF: ABNORMAL /HPF

## 2023-11-13 PROCEDURE — 85025 COMPLETE CBC W/AUTO DIFF WBC: CPT | Performed by: EMERGENCY MEDICINE

## 2023-11-13 PROCEDURE — 81003 URINALYSIS AUTO W/O SCOPE: CPT | Performed by: EMERGENCY MEDICINE

## 2023-11-13 PROCEDURE — 96375 TX/PRO/DX INJ NEW DRUG ADDON: CPT

## 2023-11-13 PROCEDURE — 99284 EMERGENCY DEPT VISIT MOD MDM: CPT | Mod: 25

## 2023-11-13 PROCEDURE — 87086 URINE CULTURE/COLONY COUNT: CPT | Performed by: EMERGENCY MEDICINE

## 2023-11-13 PROCEDURE — 3E0333Z INTRODUCTION OF ANTI-INFLAMMATORY INTO PERIPHERAL VEIN, PERCUTANEOUS APPROACH: ICD-10-PCS | Performed by: EMERGENCY MEDICINE

## 2023-11-13 PROCEDURE — 80048 BASIC METABOLIC PNL TOTAL CA: CPT | Performed by: EMERGENCY MEDICINE

## 2023-11-13 PROCEDURE — 84703 CHORIONIC GONADOTROPIN ASSAY: CPT | Performed by: EMERGENCY MEDICINE

## 2023-11-13 PROCEDURE — 96374 THER/PROPH/DIAG INJ IV PUSH: CPT

## 2023-11-13 PROCEDURE — 63600000 HC DRUGS/DETAIL CODE: Performed by: PHYSICIAN ASSISTANT

## 2023-11-13 PROCEDURE — 81001 URINALYSIS AUTO W/SCOPE: CPT | Performed by: EMERGENCY MEDICINE

## 2023-11-13 PROCEDURE — 36415 COLL VENOUS BLD VENIPUNCTURE: CPT | Performed by: EMERGENCY MEDICINE

## 2023-11-13 PROCEDURE — 3E033GC INTRODUCTION OF OTHER THERAPEUTIC SUBSTANCE INTO PERIPHERAL VEIN, PERCUTANEOUS APPROACH: ICD-10-PCS | Performed by: EMERGENCY MEDICINE

## 2023-11-13 RX ORDER — KETOROLAC TROMETHAMINE 15 MG/ML
15 INJECTION, SOLUTION INTRAMUSCULAR; INTRAVENOUS ONCE
Status: COMPLETED | OUTPATIENT
Start: 2023-11-13 | End: 2023-11-13

## 2023-11-13 RX ORDER — ONDANSETRON HYDROCHLORIDE 2 MG/ML
4 INJECTION, SOLUTION INTRAVENOUS ONCE
Status: COMPLETED | OUTPATIENT
Start: 2023-11-13 | End: 2023-11-13

## 2023-11-13 RX ADMIN — ONDANSETRON 4 MG: 2 INJECTION INTRAMUSCULAR; INTRAVENOUS at 14:07

## 2023-11-13 RX ADMIN — KETOROLAC TROMETHAMINE 15 MG: 15 INJECTION, SOLUTION INTRAMUSCULAR; INTRAVENOUS at 14:05

## 2023-11-13 ASSESSMENT — ENCOUNTER SYMPTOMS
SHORTNESS OF BREATH: 0
CHILLS: 0
BACK PAIN: 0
FLANK PAIN: 0
CONSTIPATION: 0
DIARRHEA: 0
BLOOD IN STOOL: 0
ABDOMINAL DISTENTION: 0
DIFFICULTY URINATING: 0
DYSURIA: 0
NAUSEA: 0
FREQUENCY: 0
APPETITE CHANGE: 0
VOMITING: 0
ABDOMINAL PAIN: 0
FEVER: 0
CHEST TIGHTNESS: 0
HEMATURIA: 0

## 2023-11-13 NOTE — ED PROVIDER NOTES
Emergency Medicine Note  HPI   HISTORY OF PRESENT ILLNESS     33-year-old female no significant past medical history presents to the ER for pelvic cramping.  Patient reports she is experiencing severe lower pelvic pain.  States she just came from her GYN Dr. Faust office.  She had a ultrasound-guided sonohystogram done today around 1130.  Discussed with provider who reports procedure was brief and on invasive.  She also reports there is no significant bleeding or complication to the procedure.  She states there was no perforation during the procedure.  Patient started to experience approximately 10 minutes later severe lower pelvic cramping.  Describes it similar to her menstrual cycle cramps but more intense.  She states she is still having the same pad that they gave her after the procedure which is mild spotting.  Denies any heavy bleeding, soaking pads or passing clots.  She states she feels slightly nauseous and felt near syncopal at the office.  She states they checked her blood sugar and it was noted to be low.  Patient then vomited.  She was given juice and feels improved now.  She states the pain comes in waves and is not constant.  Denies any urinary symptoms, fevers or chills.            Patient History   PAST HISTORY     Reviewed from Nursing Triage:       Past Medical History:   Diagnosis Date    Screening for breast cancer     breast ultrasound in 2007/2008 due to mass in right breast       Past Surgical History:   Procedure Laterality Date    BREAST BIOPSY Right 2007/2008    benign per patient report    BREAST SURGERY Right [id number]    right side benign tumor removed (pathology unavailable for review)    CERVICAL BIOPSY  W/ LOOP ELECTRODE EXCISION      due to abnormal PAP       Family History   Problem Relation Age of Onset    Breast cancer Biological Mother     Hyperlipidemia Biological Father     Hypertension Biological Father     Diabetes Paternal Grandfather        Social History      Tobacco Use    Smoking status: Never    Smokeless tobacco: Never   Vaping Use    Vaping Use: Never used   Substance Use Topics    Alcohol use: Yes     Comment: social     Drug use: Never         Review of Systems   REVIEW OF SYSTEMS     Review of Systems   Constitutional: Negative for appetite change, chills and fever.   Respiratory: Negative for chest tightness and shortness of breath.    Cardiovascular: Negative for chest pain.   Gastrointestinal: Negative for abdominal distention, abdominal pain, blood in stool, constipation, diarrhea, nausea and vomiting.   Genitourinary: Positive for pelvic pain. Negative for difficulty urinating, dysuria, flank pain, frequency, hematuria and urgency.   Musculoskeletal: Negative for back pain.   Skin: Negative for rash.         VITALS     ED Vitals    Date/Time Temp Pulse Resp BP SpO2 [facility]   11/13/23 1325 36.6 °C (97.9 °F) 72 19 131/61 100 % AMT                       Physical Exam   PHYSICAL EXAM     Physical Exam  Vitals and nursing note reviewed.   Constitutional:       General: She is not in acute distress.     Appearance: She is well-developed.   Cardiovascular:      Rate and Rhythm: Normal rate and regular rhythm.      Heart sounds: No murmur heard.  Pulmonary:      Effort: Pulmonary effort is normal. No respiratory distress.      Breath sounds: No stridor.   Chest:      Chest wall: No tenderness.   Abdominal:      General: There is no distension.      Palpations: Abdomen is soft.      Tenderness: There is generalized abdominal tenderness. There is no guarding or rebound.   Musculoskeletal:      Cervical back: Normal range of motion and neck supple.   Skin:     General: Skin is warm and dry.   Neurological:      Mental Status: She is alert and oriented to person, place, and time.           PROCEDURES     Procedures     DATA     Results     None          Imaging Results    None         No orders to display       Scoring tools                                  ED  Course & MDM   MDM / ED COURSE / CLINICAL IMPRESSION / DISPO     MDM    ED Course as of 11/13/23 1450   Mon Nov 13, 2023   1410 Sonohystogram- no dilation or concern for rupture per Dr. Pardo. Sent in for pain control  [EB]   1449 Pt feeling improved. Will continue to monitor  [EB]      ED Course User Index  [EB] Lalita Verma PA C     Clinical Impression      None               Lalita Verma PA C  11/13/23 1461

## 2023-11-15 LAB — BACTERIA UR CULT: NORMAL

## 2023-11-15 NOTE — ED ATTESTATION NOTE
I have personally seen and examined the patient.  I personally performed the key components of the encounter and provided a substantive portion of the care and medical decision making for this patient.  I reviewed and agree with physician assistant / nurse practitioners assessment and plan of care, with the following exceptions: None  My examination, assessment, and plan of care of Lazara Ferguson is as follows:     Exam: After meds, well-appearing, no acute distress awake alert oriented x3, abdomen soft nontender, no guarding or rebound, normal pulses    Assessment and plan: Patient presents after sonohysterogram with significant pain, sent by OB for pain control, they do not feel there is been a complication of the procedure likely just spasm, lab work reassuring, improved with meds, symptomatic treatment and outpatient follow-up    Differential diagnoses considered but not limited to, include: Postoperative pain, perforation, spasm     Navi West, DO  11/14/23 3791       Navi West, DO  11/14/23 8388

## 2023-12-23 LAB
T3 SERPL-MCNC: 170 NG/DL (ref 71–180)
T4 FREE SERPL-MCNC: 1.09 NG/DL (ref 0.82–1.77)
TSH SERPL DL<=0.005 MIU/L-ACNC: 0.73 UIU/ML (ref 0.45–4.5)

## 2023-12-27 ENCOUNTER — TELEPHONE (OUTPATIENT)
Dept: PRIMARY CARE | Facility: CLINIC | Age: 33
End: 2023-12-27
Payer: COMMERCIAL

## 2024-01-01 ENCOUNTER — TELEPHONE (OUTPATIENT)
Dept: OTHER | Facility: OTHER | Age: 34
End: 2024-01-01

## 2024-01-02 NOTE — TELEPHONE ENCOUNTER
Patient is calling regarding cancelling an appointment.    Date/Time: 01/02 @11a    Patient was rescheduled: YES [] NO [x]    Patient requesting call back to reschedule: YES [x] NO []

## 2024-02-19 ENCOUNTER — OFFICE VISIT (OUTPATIENT)
Dept: OBGYN CLINIC | Facility: CLINIC | Age: 34
End: 2024-02-19

## 2024-02-19 VITALS
HEIGHT: 65 IN | SYSTOLIC BLOOD PRESSURE: 141 MMHG | WEIGHT: 149.4 LBS | BODY MASS INDEX: 24.89 KG/M2 | HEART RATE: 86 BPM | DIASTOLIC BLOOD PRESSURE: 80 MMHG

## 2024-02-19 DIAGNOSIS — N92.0 MENORRHAGIA WITH REGULAR CYCLE: ICD-10-CM

## 2024-02-19 DIAGNOSIS — D25.1 FIBROIDS, INTRAMURAL: Primary | ICD-10-CM

## 2024-02-19 DIAGNOSIS — N93.9 ABNORMAL UTERINE BLEEDING (AUB): ICD-10-CM

## 2024-02-19 PROCEDURE — 99203 OFFICE O/P NEW LOW 30 MIN: CPT | Performed by: OBSTETRICS & GYNECOLOGY

## 2024-02-19 RX ORDER — DROSPIRENONE AND ETHINYL ESTRADIOL 0.02-3(28)
KIT ORAL
COMMUNITY
Start: 2023-12-07

## 2024-02-19 RX ORDER — TRANEXAMIC ACID 650 MG/1
650 TABLET ORAL 3 TIMES DAILY
Qty: 15 TABLET | Refills: 6 | Status: SHIPPED | OUTPATIENT
Start: 2024-02-19 | End: 2024-02-24

## 2024-02-19 NOTE — PROGRESS NOTES
"PROBLEM GYNECOLOGICAL VISIT    Faith Oro is a 34 y.o. female who presents today for a second opinion.  Her general medical history has been reviewed and she reports it as follows:    No past medical history on file.  No past surgical history on file.  OB History    No obstetric history on file.       Social History     Tobacco Use    Smoking status: Never    Smokeless tobacco: Never   Vaping Use    Vaping status: Never Used   Substance Use Topics    Alcohol use: Never    Drug use: Never     Social History     Substance and Sexual Activity   Sexual Activity Yes    Partners: Male    Birth control/protection: OCP       Current Outpatient Medications   Medication Instructions    Vestura 3-0.02 MG per tablet TAKE 1 TABLET BY MOUTH EVERY DAY FOR 84 DAYS       History of Present Illness:   Patient present for second opinion on symptomatic fibroid uterus and OCP.  Patient states has a fibroid which she was told by previous gyn who no longer accepts her insurance plan.  Patient also states that she is unsure if she should stop her OCP.    Review of Systems:  Review of Systems   Genitourinary:  Positive for dyspareunia and menstrual problem.        Menorrhagia, AUB   All other systems reviewed and are negative.      Physical Exam:  /80   Pulse 86   Ht 5' 5\" (1.651 m)   Wt 67.8 kg (149 lb 6.4 oz)   LMP 02/03/2024   BMI 24.86 kg/m²   Physical Exam  Constitutional:       Appearance: Normal appearance.   Genitourinary:      Bladder and urethral meatus normal.      No lesions in the vagina.      Right Labia: No rash, tenderness or lesions.     Left Labia: No tenderness, lesions or rash.     No vaginal bleeding.        Right Adnexa: not tender, not full and no mass present.     Left Adnexa: not tender, not full and no mass present.     No cervical motion tenderness, discharge or lesion.      Uterus is enlarged.      Uterus is not tender.      Uterine mass present.     No urethral tenderness or mass present. "   Neurological:      Mental Status: She is alert.   Vitals reviewed.         Discussion:  Discuss with patient after reviewing her records will need a pelvic sonogram for further evaluation. Recommend TXA for her heavy menses and the AUB could be due to the fibroid or a polyp.       Assessment:   1. Symptomatic Fibroid uteus    Plan:   1. Tranexamic acid escribed   2. Imaging ordered: pelvic sonogram   3. Return to office 3wks.   4. Patient's depression screening was assessed with a PHQ-2 score of 0. Their PHQ-9 score was 2. Clinically patient does not have depression. No treatment is required.      Reviewed with patient that test results are available in [facility] immediately, but that they will not necessarily be reviewed by me immediately.  Explained that I will review results at my earliest opportunity and contact patient appropriately.

## 2024-02-27 ENCOUNTER — HOSPITAL ENCOUNTER (OUTPATIENT)
Dept: ULTRASOUND IMAGING | Facility: HOSPITAL | Age: 34
Discharge: HOME | End: 2024-02-27
Payer: COMMERCIAL

## 2024-02-27 DIAGNOSIS — D25.1 FIBROIDS, INTRAMURAL: ICD-10-CM

## 2024-02-27 PROCEDURE — 76830 TRANSVAGINAL US NON-OB: CPT

## 2024-02-27 PROCEDURE — 76856 US EXAM PELVIC COMPLETE: CPT

## 2024-03-18 ENCOUNTER — TELEPHONE (OUTPATIENT)
Dept: OBGYN CLINIC | Facility: CLINIC | Age: 34
End: 2024-03-18

## 2024-03-20 ENCOUNTER — TELEMEDICINE (OUTPATIENT)
Dept: OBGYN CLINIC | Facility: CLINIC | Age: 34
End: 2024-03-20

## 2024-03-20 DIAGNOSIS — D25.1 FIBROIDS, INTRAMURAL: ICD-10-CM

## 2024-03-20 DIAGNOSIS — N92.0 MENORRHAGIA WITH REGULAR CYCLE: ICD-10-CM

## 2024-03-20 DIAGNOSIS — N93.9 ABNORMAL UTERINE BLEEDING (AUB): ICD-10-CM

## 2024-03-20 DIAGNOSIS — Z71.2 ENCOUNTER TO DISCUSS TEST RESULTS: Primary | ICD-10-CM

## 2024-03-20 PROCEDURE — 99213 OFFICE O/P EST LOW 20 MIN: CPT | Performed by: OBSTETRICS & GYNECOLOGY

## 2024-03-20 NOTE — PROGRESS NOTES
Virtual Regular Visit    Verification of patient location:    Patient is located at Home in the following state in which I hold an active license [place]      Assessment/Plan:    Problem List Items Addressed This Visit          Genitourinary    Fibroids, intramural     Other Visit Diagnoses       Encounter to discuss test results    -  Primary                 Reason for visit is   Chief Complaint   Patient presents with    Virtual Regular Visit          Encounter provider Yardlie Toussaint-Foster, DO    Provider located at [facility]  [address] [place] [address] [phone]      Recent Visits  Date Type Provider Dept   03/18/24 Telephone Yardlie Toussaint-Foster, DO [facility]   Showing recent visits within past 7 days and meeting all other requirements  Today's Visits  Date Type Provider Dept   03/20/24 Telemedicine Yardlie Toussaint-Foster, DO [facility]   Showing today's visits and meeting all other requirements  Future Appointments  No visits were found meeting these conditions.  Showing future appointments within next 150 days and meeting all other requirements       The patient was identified by name and date of birth. Faith Oro was informed that this is a telemedicine visit and that the visit is being conducted through the Epic Embedded platform. She agrees to proceed..  My office door was closed. No one else was in the room.  She acknowledged consent and understanding of privacy and security of the video platform. The patient has agreed to participate and understands they can discontinue the visit at any time.    Patient is aware this is a billable service.     Subjective  Faith Oro is a 34 y.o. female with symptomatic fibroid uterus .      HPI   Patient with h/o symptomatic fibroid uterus who is entertaining possible conception in the future and the cause of the heavy and abnormal menses and  bleeding.      No past medical history on file.    No past surgical history on file.    Current Outpatient Medications   Medication Sig Dispense Refill    Vestura 3-0.02 MG per tablet TAKE 1 TABLET BY MOUTH EVERY DAY FOR 84 DAYS       No current facility-administered medications for this visit.        No Known Allergies    Review of Systems    Video Exam    There were no vitals filed for this visit.    Physical Exam     Discussion:  Discuss with patient sonogram consist of 2 large intramural fibroids 6.2cm and 3.9cm.  Discuss with patient management options for preservation of her fertility recommend an mri for further  evaluation, a HSG for tubal patency, and UAE to help in decreasing the size of the fibroids.  Discuss with patient alternative management via myomectomy.  Patient has opted for medical management.        Visit Time  Total Visit Duration: 15

## 2024-03-20 NOTE — PROGRESS NOTES
PROBLEM GYNECOLOGICAL VISIT    Faith Oro is a 34 y.o. female who presents for virtual video visit to discuss her results.  Her general medical history has been reviewed and she reports it as follows:    No past medical history on file.  No past surgical history on file.  OB History    No obstetric history on file.       Social History     Tobacco Use    Smoking status: Never    Smokeless tobacco: Never   Vaping Use    Vaping status: Never Used   Substance Use Topics    Alcohol use: Never    Drug use: Never     Social History     Substance and Sexual Activity   Sexual Activity Yes    Partners: Male    Birth control/protection: OCP       Current Outpatient Medications   Medication Instructions    Vestura 3-0.02 MG per tablet TAKE 1 TABLET BY MOUTH EVERY DAY FOR 84 DAYS       History of Present Illness:   Patient is s/p pelvic sonogram for fibroid uterus.    Review of Systems:  Review of Systems   All other systems reviewed and are negative.      Physical Exam:  LMP 02/03/2024   Physical Exam  Constitutional:       Appearance: Normal appearance.   Neurological:      Mental Status: She is alert.            Assessment:   1. Symptomatic fibroid uterus    Plan:     1. Imaging ordered: mri of pelvis   2. Return to office 3wks.       Reviewed with patient that [facility] immediately, but that they will not necessarily be reviewed by me immediately.  Explained that I will review results at my earliest opportunity and contact patient appropriately.

## 2024-04-17 ENCOUNTER — HOSPITAL ENCOUNTER (OUTPATIENT)
Dept: MRI IMAGING | Facility: HOSPITAL | Age: 34
Discharge: HOME | End: 2024-04-17
Attending: OBSTETRICS & GYNECOLOGY
Payer: COMMERCIAL

## 2024-04-17 DIAGNOSIS — D25.1 FIBROIDS, INTRAMURAL: ICD-10-CM

## 2024-04-17 DIAGNOSIS — N92.0 MENORRHAGIA WITH REGULAR CYCLE: ICD-10-CM

## 2024-04-17 DIAGNOSIS — N93.9 ABNORMAL UTERINE BLEEDING (AUB): ICD-10-CM

## 2024-04-17 PROCEDURE — A9585 GADOBUTROL INJECTION: HCPCS | Performed by: OBSTETRICS & GYNECOLOGY

## 2024-04-17 PROCEDURE — 72197 MRI PELVIS W/O & W/DYE: CPT

## 2024-04-17 RX ORDER — GADOBUTROL 604.72 MG/ML
6 INJECTION INTRAVENOUS
Status: COMPLETED | OUTPATIENT
Start: 2024-04-17 | End: 2024-04-17

## 2024-04-17 RX ADMIN — GADOBUTROL 6 ML: 604.72 INJECTION INTRAVENOUS at 08:39

## 2024-04-30 ENCOUNTER — OFFICE VISIT (OUTPATIENT)
Dept: OBGYN CLINIC | Facility: CLINIC | Age: 34
End: 2024-04-30

## 2024-04-30 VITALS
SYSTOLIC BLOOD PRESSURE: 114 MMHG | HEART RATE: 82 BPM | HEIGHT: 65 IN | WEIGHT: 151.8 LBS | BODY MASS INDEX: 25.29 KG/M2 | DIASTOLIC BLOOD PRESSURE: 70 MMHG

## 2024-04-30 DIAGNOSIS — D25.1 FIBROIDS, INTRAMURAL: ICD-10-CM

## 2024-04-30 DIAGNOSIS — Z71.2 ENCOUNTER TO DISCUSS TEST RESULTS: Primary | ICD-10-CM

## 2024-04-30 PROCEDURE — 99213 OFFICE O/P EST LOW 20 MIN: CPT | Performed by: OBSTETRICS & GYNECOLOGY

## 2024-04-30 NOTE — PROGRESS NOTES
"PROBLEM GYNECOLOGICAL VISIT    Faith Oro is a 34 y.o. female who presents today for results.  Her general medical history has been reviewed and she reports it as follows:    No past medical history on file.  No past surgical history on file.  OB History    No obstetric history on file.       Social History     Tobacco Use    Smoking status: Never    Smokeless tobacco: Never   Vaping Use    Vaping status: Never Used   Substance Use Topics    Alcohol use: Never    Drug use: Never     Social History     Substance and Sexual Activity   Sexual Activity Yes    Partners: Male    Birth control/protection: OCP       Current Outpatient Medications   Medication Instructions    Vestura 3-0.02 MG per tablet TAKE 1 TABLET BY MOUTH EVERY DAY FOR 84 DAYS       History of Present Illness:   Patient presents for follow up s/p mri for fibroid uterus with no new complaints.    Review of Systems:  Review of Systems   Genitourinary:  Negative for pelvic pain, vaginal bleeding and vaginal discharge.   All other systems reviewed and are negative.      Physical Exam:  /70 (BP Location: Left arm, Patient Position: Sitting, Cuff Size: Standard)   Pulse 82   Ht 5' 5\" (1.651 m)   Wt 68.9 kg (151 lb 12.8 oz)   LMP 04/26/2024   BMI 25.26 kg/m²   Physical Exam  Constitutional:       Appearance: Normal appearance.   Neurological:      Mental Status: She is alert.   Vitals reviewed.         Discussion:  Discuss with patient the mri with findings of largest fibroid 6cm and intramural with the second largest 3.7cm with multiple small ones. Recommend UAE with IR.  Patient agrees with above.       Assessment:   1. Fibroid uterus    Plan:   1. Referral to IR   2. Return to office prn.     Reviewed with patient that test results [facility] immediately, but that they will not necessarily be reviewed by me immediately.  Explained that I will review results at my earliest opportunity and contact patient appropriately.  "

## 2024-05-13 ENCOUNTER — TELEMEDICINE (OUTPATIENT)
Dept: INTERVENTIONAL RADIOLOGY/VASCULAR | Facility: CLINIC | Age: 34
End: 2024-05-13
Payer: COMMERCIAL

## 2024-05-13 DIAGNOSIS — N93.9 ABNORMAL UTERINE BLEEDING (AUB): Primary | ICD-10-CM

## 2024-05-13 PROCEDURE — 99203 OFFICE O/P NEW LOW 30 MIN: CPT | Performed by: RADIOLOGY

## 2024-05-13 NOTE — PROGRESS NOTES
Virtual Regular Visit    Verification of patient location:    Patient is located at Home in the following state in which I hold an active license [place]      Assessment/Plan:    Problem List Items Addressed This Visit          Genitourinary    Abnormal uterine bleeding (AUB) - Primary    Relevant Orders    IR uterine artery embolization   In summary, 34-year-old female with medical history of menorrhagia and fibroid uterus. Patient is seen in IR clinic for consideration of uterine artery embolization.       I discussed the different options to treat uterine fibroids and menorrhagia including watchful waiting, medical therapy, surgery, and uterine artery embolization. She also discussed options with Dr. Toussaint-Foster.  I discussed with the patient that hysterectomy is a curative therapy because the uterus is completely removed and myomectomy is highly effective. I discussed the benefits, risks and procedure details of uterine artery embolization.  Specifically, I discussed that the procedure is often done using moderate sedation and that favorable outcomes are generally seen in roughly 90% of women with an approximate 10% reintervention rate either with repeat uterine artery embolization or hysterectomy. I stated that multiple studies have demonstrated successful pregnancies following UAE. However, there is a very small chance that future ability to achieve a successful pregnancy may be negatively impacted including pregnancy loss and IUGR. Additionally, there is a minimal risk of early menopause.     I discussed that we often admit overnight for observation and management of pain but that several patients go home the same day.  I discussed risks such as bleeding, infection, nontarget embolization, myometrial injury, and transient or permanent amenorrhea (1 to 5%).  Rarely, pulmonary embolism.  Additionally, I discussed that patients need to be willing to undergo hysterectomy in the event of UAE complication such a  possible sloughed fibroid with non passage resulting in infection.  This could occur anywhere from 3 weeks to 6 months.      The patient acknowledged and understood the discussion.  I answered all of her questions to her apparent satisfaction. She wishes to pursue UAE. I will initiate scheduling.        Reason for visit is   Chief Complaint   Patient presents with    Virtual Regular Visit          Encounter provider Ismael Sotelo DO      Recent Visits  Date Type Provider Dept   05/13/24 Telemedicine Ismael Sotelo DO [facility]   Showing recent visits within past 7 days and meeting all other requirements  Future Appointments  No visits were found meeting these conditions.  Showing future appointments within next 150 days and meeting all other requirements       The patient was identified by name and date of birth. Faith Oro was informed that this is a telemedicine visit and that the visit is being conducted through the Epic Embedded platform. She agrees to proceed..  My office door was closed. No one else was in the room.  She acknowledged consent and understanding of privacy and security of the video platform. The patient has agreed to participate and understands they can discontinue the visit at any time.    Patient is aware this is a billable service.     CC: Fibroids    HPI: 34 y F w/ symptomatic fibroids including menorrhagia, pain, and urinary frequency possibly related to bulk effect from fibroids. She states her symptoms began about a year ago. Her cycle is still regular, but instead of 3 days her period lasts 7 days. She desires children. Currently on OCPs. MRI / US done showing fibroids. Fibroids enhance. No definite submucosal fibroid. Largest measures 8.1 cm, anterior intramural location and causes endometrial displacement. Discussed options with Dr. Toussaint-Foster and recommendation was for UFE consideration.      MRI pelvis 4/17/2024 - I personally reviewed imaging.    Enlarged uterus measuring approximately 7.2 x 6.3 x 8.1 cm which is due to the presence of multiple fibroids.     Largest fibroid measures 6.0 x 4.8 x 5.5 cm. It is in and anterior intramural location within the body and fundus. It displaces but does not contact the endometrium.     Next largest fibroid measures 3.7 x 2.8 x 3.1 cm and is subserosal with greater than 50% being intramural in the posterior body     There are additional scattered subcentimeter intramural fibroids.     All fibroids demonstrate enhancement that is slightly less than background myometrium, without significant necrosis.    Med/Surg History:  BREAST LUMPECTOMY 1/1/2005 - 12/31/2005 Right   CERVICAL BIOPSY W/ LOOP ELECTRODE EXCISION       Current Outpatient Medications   Medication Sig Dispense Refill    Vestura 3-0.02 MG per tablet TAKE 1 TABLET BY MOUTH EVERY DAY FOR 84 DAYS       No current facility-administered medications for this visit.        No Known Allergies    Review of Systems   All other systems reviewed and are negative.      Video Exam    There were no vitals filed for this visit.    Physical Exam  Constitutional:       Appearance: Normal appearance.   HENT:      Head: Normocephalic.   Eyes:      Conjunctiva/sclera: Conjunctivae normal.      Pupils: Pupils are equal, round, and reactive to light.   Pulmonary:      Effort: Pulmonary effort is normal.   Abdominal:      General: Abdomen is flat.   Musculoskeletal:         General: Normal range of motion.      Cervical back: Normal range of motion.   Neurological:      General: No focal deficit present.      Mental Status: She is alert and oriented to person, place, and time.   Psychiatric:         Mood and Affect: Mood normal.         Behavior: Behavior normal.          Visit Time  Total Visit Duration: 30 minutes

## 2024-05-14 RX ORDER — DEXAMETHASONE SODIUM PHOSPHATE 10 MG/ML
10 INJECTION, SOLUTION INTRAMUSCULAR; INTRAVENOUS ONCE
OUTPATIENT
Start: 2024-05-14 | End: 2024-05-14

## 2024-05-14 RX ORDER — ACETAMINOPHEN 325 MG/1
975 TABLET ORAL ONCE
OUTPATIENT
Start: 2024-05-14 | End: 2024-05-14

## 2024-05-14 RX ORDER — SODIUM CHLORIDE 9 MG/ML
125 INJECTION, SOLUTION INTRAVENOUS CONTINUOUS
OUTPATIENT
Start: 2024-05-14

## 2024-05-14 RX ORDER — ONDANSETRON 2 MG/ML
4 INJECTION INTRAMUSCULAR; INTRAVENOUS ONCE
OUTPATIENT
Start: 2024-05-14 | End: 2024-05-14

## 2024-05-14 RX ORDER — LEVOFLOXACIN 5 MG/ML
500 INJECTION, SOLUTION INTRAVENOUS ONCE
OUTPATIENT
Start: 2024-05-14 | End: 2024-05-14

## 2024-05-14 RX ORDER — KETOROLAC TROMETHAMINE 30 MG/ML
30 INJECTION, SOLUTION INTRAMUSCULAR; INTRAVENOUS ONCE
OUTPATIENT
Start: 2024-05-14 | End: 2024-05-14

## 2024-06-10 ENCOUNTER — TELEPHONE (OUTPATIENT)
Dept: PRIMARY CARE | Facility: CLINIC | Age: 34
End: 2024-06-10
Payer: COMMERCIAL

## 2024-06-10 NOTE — TELEPHONE ENCOUNTER
Test Order Request   Patient PCP: Adenike Hernandez CRNP  Next Office Visit: 11/8/2024  Preferred Lab: [id number]  [address] [address]  Tests Requested: MRI Script of  Right Knee  , MyChart, or US Mail?: MyChart    The practice will reach out to discuss your request within 2 business days.   I have personally performed a face to face diagnostic evaluation on this patient. I have reviewed the ACP note and agree with the history, exam and plan of care, except as noted.

## 2024-06-12 ENCOUNTER — TELEPHONE (OUTPATIENT)
Dept: INTERVENTIONAL RADIOLOGY/VASCULAR | Facility: HOSPITAL | Age: 34
End: 2024-06-12

## 2024-06-21 ENCOUNTER — TRANSCRIBE ORDERS (OUTPATIENT)
Dept: SCHEDULING | Age: 34
End: 2024-06-21

## 2024-06-21 DIAGNOSIS — M23.91 UNSPECIFIED INTERNAL DERANGEMENT OF RIGHT KNEE: Primary | ICD-10-CM

## 2024-06-24 ENCOUNTER — HOSPITAL ENCOUNTER (OUTPATIENT)
Dept: INTERVENTIONAL RADIOLOGY/VASCULAR | Facility: HOSPITAL | Age: 34
Discharge: HOME | End: 2024-06-25
Attending: RADIOLOGY | Admitting: INTERNAL MEDICINE
Payer: COMMERCIAL

## 2024-06-24 DIAGNOSIS — N93.9 ABNORMAL UTERINE BLEEDING (AUB): ICD-10-CM

## 2024-06-24 DIAGNOSIS — Z98.890 STATUS POST EMBOLIZATION OF UTERINE ARTERY: Primary | ICD-10-CM

## 2024-06-24 LAB
ANION GAP SERPL CALCULATED.3IONS-SCNC: 6 MMOL/L (ref 4–13)
BUN SERPL-MCNC: 9 MG/DL (ref 5–25)
CALCIUM SERPL-MCNC: 9.5 MG/DL (ref 8.4–10.2)
CHLORIDE SERPL-SCNC: 102 MMOL/L (ref 96–108)
CO2 SERPL-SCNC: 27 MMOL/L (ref 21–32)
CREAT SERPL-MCNC: 0.82 MG/DL (ref 0.6–1.3)
ERYTHROCYTE [DISTWIDTH] IN BLOOD BY AUTOMATED COUNT: 11.9 % (ref 11.6–15.1)
EXT PREGNANCY TEST URINE: NEGATIVE
EXT. CONTROL: NORMAL
GFR SERPL CREATININE-BSD FRML MDRD: 92 ML/MIN/1.73SQ M
GLUCOSE P FAST SERPL-MCNC: 92 MG/DL (ref 65–99)
GLUCOSE SERPL-MCNC: 92 MG/DL (ref 65–140)
HCT VFR BLD AUTO: 35.6 % (ref 34.8–46.1)
HGB BLD-MCNC: 11.6 G/DL (ref 11.5–15.4)
INR PPP: 0.97 (ref 0.84–1.19)
MCH RBC QN AUTO: 30.1 PG (ref 26.8–34.3)
MCHC RBC AUTO-ENTMCNC: 32.6 G/DL (ref 31.4–37.4)
MCV RBC AUTO: 92 FL (ref 82–98)
PLATELET # BLD AUTO: 326 THOUSANDS/UL (ref 149–390)
PMV BLD AUTO: 9 FL (ref 8.9–12.7)
POTASSIUM SERPL-SCNC: 3.8 MMOL/L (ref 3.5–5.3)
PROTHROMBIN TIME: 13.3 SECONDS (ref 11.6–14.5)
RBC # BLD AUTO: 3.86 MILLION/UL (ref 3.81–5.12)
SODIUM SERPL-SCNC: 135 MMOL/L (ref 135–147)
WBC # BLD AUTO: 3.96 THOUSAND/UL (ref 4.31–10.16)

## 2024-06-24 PROCEDURE — 37243 VASC EMBOLIZE/OCCLUDE ORGAN: CPT | Performed by: RADIOLOGY

## 2024-06-24 PROCEDURE — C1887 CATHETER, GUIDING: HCPCS

## 2024-06-24 PROCEDURE — 80048 BASIC METABOLIC PNL TOTAL CA: CPT | Performed by: RADIOLOGY

## 2024-06-24 PROCEDURE — 99223 1ST HOSP IP/OBS HIGH 75: CPT | Performed by: PHYSICIAN ASSISTANT

## 2024-06-24 PROCEDURE — C1894 INTRO/SHEATH, NON-LASER: HCPCS

## 2024-06-24 PROCEDURE — 85610 PROTHROMBIN TIME: CPT | Performed by: RADIOLOGY

## 2024-06-24 PROCEDURE — 37242 VASC EMBOLIZE/OCCLUDE ARTERY: CPT

## 2024-06-24 PROCEDURE — C1769 GUIDE WIRE: HCPCS

## 2024-06-24 PROCEDURE — 76937 US GUIDE VASCULAR ACCESS: CPT

## 2024-06-24 PROCEDURE — 76937 US GUIDE VASCULAR ACCESS: CPT | Performed by: RADIOLOGY

## 2024-06-24 PROCEDURE — C1760 CLOSURE DEV, VASC: HCPCS

## 2024-06-24 PROCEDURE — 36247 INS CATH ABD/L-EXT ART 3RD: CPT | Performed by: RADIOLOGY

## 2024-06-24 PROCEDURE — 85027 COMPLETE CBC AUTOMATED: CPT | Performed by: RADIOLOGY

## 2024-06-24 PROCEDURE — 81025 URINE PREGNANCY TEST: CPT | Performed by: RADIOLOGY

## 2024-06-24 RX ORDER — LEVOFLOXACIN 5 MG/ML
500 INJECTION, SOLUTION INTRAVENOUS ONCE
Status: COMPLETED | OUTPATIENT
Start: 2024-06-24 | End: 2024-06-24

## 2024-06-24 RX ORDER — ONDANSETRON 2 MG/ML
4 INJECTION INTRAMUSCULAR; INTRAVENOUS EVERY 6 HOURS PRN
Status: DISCONTINUED | OUTPATIENT
Start: 2024-06-24 | End: 2024-06-25 | Stop reason: HOSPADM

## 2024-06-24 RX ORDER — HYDROMORPHONE HCL/PF 1 MG/ML
0.5 SYRINGE (ML) INJECTION
Status: DISCONTINUED | OUTPATIENT
Start: 2024-06-24 | End: 2024-06-25

## 2024-06-24 RX ORDER — OXYCODONE HYDROCHLORIDE AND ACETAMINOPHEN 5; 325 MG/1; MG/1
1 TABLET ORAL EVERY 4 HOURS PRN
Status: DISCONTINUED | OUTPATIENT
Start: 2024-06-24 | End: 2024-06-24

## 2024-06-24 RX ORDER — ONDANSETRON 2 MG/ML
4 INJECTION INTRAMUSCULAR; INTRAVENOUS ONCE
Status: DISCONTINUED | OUTPATIENT
Start: 2024-06-24 | End: 2024-06-24

## 2024-06-24 RX ORDER — MIDAZOLAM HYDROCHLORIDE 2 MG/2ML
INJECTION, SOLUTION INTRAMUSCULAR; INTRAVENOUS AS NEEDED
Status: COMPLETED | OUTPATIENT
Start: 2024-06-24 | End: 2024-06-24

## 2024-06-24 RX ORDER — DIPHENHYDRAMINE HYDROCHLORIDE 50 MG/ML
25 INJECTION INTRAMUSCULAR; INTRAVENOUS ONCE
Status: COMPLETED | OUTPATIENT
Start: 2024-06-24 | End: 2024-06-24

## 2024-06-24 RX ORDER — ACETAMINOPHEN 325 MG/1
975 TABLET ORAL ONCE
Status: DISCONTINUED | OUTPATIENT
Start: 2024-06-24 | End: 2024-06-24

## 2024-06-24 RX ORDER — HYDROMORPHONE HYDROCHLORIDE 4 MG/ML
INJECTION, SOLUTION INTRAMUSCULAR; INTRAVENOUS; SUBCUTANEOUS AS NEEDED
Status: COMPLETED | OUTPATIENT
Start: 2024-06-24 | End: 2024-06-24

## 2024-06-24 RX ORDER — SODIUM CHLORIDE 9 MG/ML
125 INJECTION, SOLUTION INTRAVENOUS CONTINUOUS
Status: DISCONTINUED | OUTPATIENT
Start: 2024-06-24 | End: 2024-06-25

## 2024-06-24 RX ORDER — FENTANYL 25 UG/1
25 PATCH TRANSDERMAL
Status: DISCONTINUED | OUTPATIENT
Start: 2024-06-24 | End: 2024-06-25 | Stop reason: HOSPADM

## 2024-06-24 RX ORDER — KETOROLAC TROMETHAMINE 30 MG/ML
30 INJECTION, SOLUTION INTRAMUSCULAR; INTRAVENOUS ONCE
Status: COMPLETED | OUTPATIENT
Start: 2024-06-24 | End: 2024-06-24

## 2024-06-24 RX ORDER — LIDOCAINE HYDROCHLORIDE 10 MG/ML
INJECTION, SOLUTION EPIDURAL; INFILTRATION; INTRACAUDAL; PERINEURAL AS NEEDED
Status: COMPLETED | OUTPATIENT
Start: 2024-06-24 | End: 2024-06-24

## 2024-06-24 RX ORDER — OXYCODONE HYDROCHLORIDE AND ACETAMINOPHEN 5; 325 MG/1; MG/1
1 TABLET ORAL ONCE
Status: COMPLETED | OUTPATIENT
Start: 2024-06-24 | End: 2024-06-24

## 2024-06-24 RX ORDER — OXYCODONE HYDROCHLORIDE 5 MG/1
5 TABLET ORAL EVERY 4 HOURS PRN
Status: DISCONTINUED | OUTPATIENT
Start: 2024-06-24 | End: 2024-06-25 | Stop reason: HOSPADM

## 2024-06-24 RX ORDER — ONDANSETRON 2 MG/ML
INJECTION INTRAMUSCULAR; INTRAVENOUS AS NEEDED
Status: COMPLETED | OUTPATIENT
Start: 2024-06-24 | End: 2024-06-24

## 2024-06-24 RX ORDER — FENTANYL CITRATE 50 UG/ML
INJECTION, SOLUTION INTRAMUSCULAR; INTRAVENOUS AS NEEDED
Status: COMPLETED | OUTPATIENT
Start: 2024-06-24 | End: 2024-06-24

## 2024-06-24 RX ORDER — KETOROLAC TROMETHAMINE 30 MG/ML
INJECTION, SOLUTION INTRAMUSCULAR; INTRAVENOUS AS NEEDED
Status: COMPLETED | OUTPATIENT
Start: 2024-06-24 | End: 2024-06-24

## 2024-06-24 RX ORDER — DEXAMETHASONE SODIUM PHOSPHATE 10 MG/ML
10 INJECTION, SOLUTION INTRAMUSCULAR; INTRAVENOUS ONCE
Status: COMPLETED | OUTPATIENT
Start: 2024-06-24 | End: 2024-06-24

## 2024-06-24 RX ORDER — IODIXANOL 320 MG/ML
250 INJECTION, SOLUTION INTRAVASCULAR
Status: COMPLETED | OUTPATIENT
Start: 2024-06-24 | End: 2024-06-24

## 2024-06-24 RX ADMIN — HYDROMORPHONE HYDROCHLORIDE 0.5 MG: 1 INJECTION, SOLUTION INTRAMUSCULAR; INTRAVENOUS; SUBCUTANEOUS at 19:53

## 2024-06-24 RX ADMIN — FENTANYL CITRATE 25 MCG: 50 INJECTION, SOLUTION INTRAMUSCULAR; INTRAVENOUS at 10:27

## 2024-06-24 RX ADMIN — LIDOCAINE HYDROCHLORIDE 5 ML: 10 INJECTION, SOLUTION EPIDURAL; INFILTRATION; INTRACAUDAL; PERINEURAL at 10:49

## 2024-06-24 RX ADMIN — DIPHENHYDRAMINE HYDROCHLORIDE 25 MG: 50 INJECTION INTRAMUSCULAR; INTRAVENOUS at 09:06

## 2024-06-24 RX ADMIN — FENTANYL 25 MCG: 25 PATCH TRANSDERMAL at 09:40

## 2024-06-24 RX ADMIN — OXYCODONE HYDROCHLORIDE 5 MG: 5 TABLET ORAL at 20:59

## 2024-06-24 RX ADMIN — HYDROMORPHONE HYDROCHLORIDE 0.5 MG: 1 INJECTION, SOLUTION INTRAMUSCULAR; INTRAVENOUS; SUBCUTANEOUS at 23:25

## 2024-06-24 RX ADMIN — DEXAMETHASONE SODIUM PHOSPHATE 10 MG: 10 INJECTION, SOLUTION INTRAMUSCULAR; INTRAVENOUS at 09:43

## 2024-06-24 RX ADMIN — HYDROMORPHONE HYDROCHLORIDE 0.5 MG: 1 INJECTION, SOLUTION INTRAMUSCULAR; INTRAVENOUS; SUBCUTANEOUS at 12:55

## 2024-06-24 RX ADMIN — HYDROMORPHONE HYDROCHLORIDE 0.5 MG: 1 INJECTION, SOLUTION INTRAMUSCULAR; INTRAVENOUS; SUBCUTANEOUS at 15:33

## 2024-06-24 RX ADMIN — OXYCODONE HYDROCHLORIDE AND ACETAMINOPHEN 1 TABLET: 5; 325 TABLET ORAL at 08:27

## 2024-06-24 RX ADMIN — FENTANYL CITRATE 25 MCG: 50 INJECTION, SOLUTION INTRAMUSCULAR; INTRAVENOUS at 09:46

## 2024-06-24 RX ADMIN — ONDANSETRON 8 MG: 2 INJECTION INTRAMUSCULAR; INTRAVENOUS at 09:08

## 2024-06-24 RX ADMIN — SODIUM CHLORIDE 125 ML/HR: 0.9 INJECTION, SOLUTION INTRAVENOUS at 18:20

## 2024-06-24 RX ADMIN — OXYCODONE HYDROCHLORIDE 5 MG: 5 TABLET ORAL at 16:15

## 2024-06-24 RX ADMIN — LEVOFLOXACIN 500 MG: 5 INJECTION, SOLUTION INTRAVENOUS at 08:45

## 2024-06-24 RX ADMIN — MIDAZOLAM 0.5 MG: 1 INJECTION INTRAMUSCULAR; INTRAVENOUS at 09:16

## 2024-06-24 RX ADMIN — IODIXANOL 50 ML: 320 INJECTION, SOLUTION INTRAVASCULAR at 11:31

## 2024-06-24 RX ADMIN — HYDROMORPHONE HYDROCHLORIDE 0.5 MG: 4 INJECTION, SOLUTION INTRAMUSCULAR; INTRAVENOUS; SUBCUTANEOUS at 10:54

## 2024-06-24 RX ADMIN — FENTANYL CITRATE 50 MCG: 50 INJECTION, SOLUTION INTRAMUSCULAR; INTRAVENOUS at 09:08

## 2024-06-24 RX ADMIN — MIDAZOLAM 0.5 MG: 1 INJECTION INTRAMUSCULAR; INTRAVENOUS at 09:46

## 2024-06-24 RX ADMIN — FENTANYL CITRATE 25 MCG: 50 INJECTION, SOLUTION INTRAMUSCULAR; INTRAVENOUS at 09:16

## 2024-06-24 RX ADMIN — OXYCODONE HYDROCHLORIDE 5 MG: 5 TABLET ORAL at 12:02

## 2024-06-24 RX ADMIN — LIDOCAINE HYDROCHLORIDE 5 ML: 10 INJECTION, SOLUTION EPIDURAL; INFILTRATION; INTRACAUDAL; PERINEURAL at 10:00

## 2024-06-24 RX ADMIN — MIDAZOLAM 0.5 MG: 1 INJECTION INTRAMUSCULAR; INTRAVENOUS at 10:27

## 2024-06-24 RX ADMIN — KETOROLAC TROMETHAMINE 30 MG: 30 INJECTION, SOLUTION INTRAMUSCULAR; INTRAVENOUS at 09:44

## 2024-06-24 RX ADMIN — Medication 125 MCG: at 10:23

## 2024-06-24 RX ADMIN — MIDAZOLAM 1 MG: 1 INJECTION INTRAMUSCULAR; INTRAVENOUS at 09:08

## 2024-06-24 RX ADMIN — KETOROLAC TROMETHAMINE 30 MG: 30 INJECTION, SOLUTION INTRAMUSCULAR; INTRAVENOUS at 10:43

## 2024-06-24 NOTE — BRIEF OP NOTE (RAD/CATH)
IR UTERINE ARTERY EMBOLIZATION  Procedure Note    PATIENT NAME: Faith Oro  : 1990  MRN: [id number]     Pre-op Diagnosis:   1. Abnormal uterine bleeding (AUB)      Post-op Diagnosis:   1. Abnormal uterine bleeding (AUB)        Surgeon:   Ismael Sotelo DO  Assistants:     No qualified resident was available.    Estimated Blood Loss: None  Findings:   R CFA 5F sheath, closed with Vascade  Bilateral uterine artery embolized to stasis.     Specimens: none    Complications:  none    Anesthesia: conscious sedation and local    Ismael Sotelo DO     Date: 2024  Time: 11:08 AM

## 2024-06-24 NOTE — PLAN OF CARE
Problem: PAIN - ADULT  Goal: Verbalizes/displays adequate comfort level or baseline comfort level  Description: Interventions:  - Encourage patient to monitor pain and request assistance  - Assess pain using appropriate pain scale  - Administer analgesics based on type and severity of pain and evaluate response  - Implement non-pharmacological measures as appropriate and evaluate response  - Consider cultural and social influences on pain and pain management  - Notify physician/advanced practitioner if interventions unsuccessful or patient reports new pain  Outcome: Progressing     Problem: INFECTION - ADULT  Goal: Absence or prevention of progression during hospitalization  Description: INTERVENTIONS:  - Assess and monitor for signs and symptoms of infection  - Monitor lab/diagnostic results  - Monitor all insertion sites, i.e. indwelling lines, tubes, and drains  - Monitor endotracheal if appropriate and nasal secretions for changes in amount and color  - [place] appropriate cooling/warming therapies per order  - Administer medications as ordered  - Instruct and encourage patient and family to use good hand hygiene technique  - Identify and instruct in appropriate isolation precautions for identified infection/condition  Outcome: Progressing  Goal: Absence of fever/infection during neutropenic period  Description: INTERVENTIONS:  - Monitor WBC    Outcome: Progressing     Problem: SAFETY ADULT  Goal: Patient will remain free of falls  Description: INTERVENTIONS:  - Educate patient/family on patient safety including physical limitations  - Instruct patient to call for assistance with activity   - Consult OT/PT to assist with strengthening/mobility   - Keep Call bell within reach  - Keep bed low and locked with side rails adjusted as appropriate  - Keep care items and personal belongings within reach  - Initiate and maintain comfort rounds  - Make Fall Risk Sign visible to staff  - Offer Toileting every 2 Hours,  in advance of need  - Initiate/Maintain bed/chair alarm  - Obtain necessary fall risk management equipment  - Apply yellow socks and bracelet for high fall risk patients  - Consider moving patient to room near nurses station  Outcome: Progressing  Goal: Maintain or return to baseline ADL function  Description: INTERVENTIONS:  -  Assess patient's ability to carry out ADLs; assess patient's baseline for ADL function and identify physical deficits which impact ability to perform ADLs (bathing, care of mouth/teeth, toileting, grooming, dressing, etc.)  - Assess/evaluate cause of self-care deficits   - Assess range of motion  - Assess patient's mobility; develop plan if impaired  - Assess patient's need for assistive devices and provide as appropriate  - Encourage maximum independence but intervene and supervise when necessary  - Involve family in performance of ADLs  - Assess for home care needs following discharge   - Consider OT consult to assist with ADL evaluation and planning for discharge  - Provide patient education as appropriate  Outcome: Progressing  Goal: Maintains/Returns to pre admission functional level  Description: INTERVENTIONS:  - Perform AM-PAC 6 Click Basic Mobility/ Daily Activity assessment daily.  - Set and communicate daily mobility goal to care team and patient/family/caregiver.   - Collaborate with rehabilitation services on mobility goals if consulted  - Perform Range of Motion 3 times a day.  - Reposition patient every 2 hours.  - Dangle patient 3 times a day  - Stand patient 3 times a day  - Ambulate patient 3 times a day  - Out of bed to chair 3 times a day   - Out of bed for meals 3 times a day  - Out of bed for toileting  - Record patient progress and toleration of activity level   Outcome: Progressing     Problem: DISCHARGE PLANNING  Goal: Discharge to home or other facility with appropriate resources  Description: INTERVENTIONS:  - Identify barriers to discharge w/patient and  caregiver  - Arrange for needed discharge resources and transportation as appropriate  - Identify discharge learning needs (meds, wound care, etc.)  - Arrange for interpretive services to assist at discharge as needed  - Refer to Case Management Department for coordinating discharge planning if the patient needs post-hospital services based on physician/advanced practitioner order or complex needs related to functional status, cognitive ability, or social support system  Outcome: Progressing     Problem: Knowledge Deficit  Goal: Patient/family/caregiver demonstrates understanding of disease process, treatment plan, medications, and discharge instructions  Description: Complete learning assessment and assess knowledge base.  Interventions:  - Provide teaching at level of understanding  - Provide teaching via preferred learning methods  Outcome: Progressing

## 2024-06-24 NOTE — DISCHARGE INSTRUCTIONS
Uterine Fibroids and Uterine Artery Embolization    WHAT YOU SHOULD KNOW:   Uterine fibroids are growths found inside your uterus (womb). Uterine fibroids also may be called tumors (lumps) or leiomyomas. Uterine fibroids often appear in groups, or you may have only one. They can be small or large, and they can grow in size. They are almost always benign (not cancer) and likely will not spread to other parts of your body.           AFTER YOU LEAVE:     Medicines:    Pain medicine:  You may be given medicine to take away or decrease pain. Do not wait until the pain is severe before you take your medicine. Take Toradol on schedule until finished, then switch to ibuprofen if desired. Do not take tylenol with Percoset.    Take your medicine as directed:  Call your caregiver if you think your medicine is not helping or if you have side effects    To avoid narcotic related constipation: Take an over the counter stool softener, such as Colace, 100 mg twice a day, or whatever you typically prefer. Drink plenty of fluids.  Resume your normal diet. Small sips of flat soda will help with nausea.    Follow up with your GYN as directed.     Avoid heavy lifting:  You will need to avoid lifting heavy objects for 3 days. This helps prevent injury to your puncture in the groin. May return to normal activity as tolerated.    WOUND CARE     Remove band aid/ dressing tomorrow. You may shower 24 hours after your procedure. Shower and wash groin area or wrist area gently with soap and water: beginning tomorrow. Rinse and pat Dry. Apply new water seal band aid. Repeat this process for 5 days.  If there is any drainage from the puncture site, you should put on a clean bandage. No Powders, creams, lotions or antibiotic ointments for 5 days.  No tub baths, hot tubs or swimming for 5 days.      Also: Drink lots of fluids and try to move around as much as possible to avoid blood clots forming in your legs.    Contact Interventional Radiology at  [phone]  if:  You have a foul smelling discharge from the vagina.  If you have fever over 101, with or without chills.  Persistent nausea or vomiting.    You have any questions about your condition or care.  You have increased vaginal bleeding, pelvic pain, or pelvic pressure. These symptoms will occur, but should get better over 3-5 days, not worse.    Seek immediate care or call 911 if:   Your heart begins to race, and you feel faint.   Your leg feels warm, tender, and painful. It may look swollen and red.  You feel lightheaded, short of breath, and have chest pain.

## 2024-06-24 NOTE — ASSESSMENT & PLAN NOTE
Patient presents for observation following uterine artery embolization for fibroids  Postprocedure developed severe pain, limited oral intake.  Per IR physician suspect postembolization syndrome  Continue symptom management with IV pain control, antiemetics, IV fluids  Discharge on Keflex or ciprofloxacin x 5 days, senna/Colace, as needed Percocet/Zofran and naprosyn ER x1 week

## 2024-06-24 NOTE — LETTER
[facility] MED SURG UNIT  [address] [address]  Dept: [phone]    June 25, 2024     Patient: Faith Oro   YOB: 1990   Date of Visit: 6/24/2024       To Whom it May Concern:    Faith Oro is under my professional care. She was seen in the hospital from 6/24/2024 to 06/25/24. She may return to work on 07/05/24 with the following limitations heavy weight lifting .    If you have any questions or concerns, please don't hesitate to call.         Sincerely,          Jose Jorge MD

## 2024-06-24 NOTE — QUICK NOTE
Ms. Oro was seen multiple times post UAE today for fibroids. She continues to have severe pain centered in her pelvis with radiation to her back, sides and legs. She states her legs hurt when she gets her routine periods. Right groin is soft and not tender.     She is able to tolerate minimal PO. Given concern for continued severe pain and limited PO intake, recommend overnight observation for pain control.     Her symptoms are typical of post-embolization syndrome, which can include nausea, vomiting, pain, leukocytosis and low grade fever. These symptoms typically last up to 7 days, but greatly diminish in intensity each day.     I recommend scheduled IV pain management, PRN for break though pain and IV fluids.     Upon discharge, generally send home with Keflex or Cipro for 5d, senna+colace, PRN percocet, PRN zofran and naprosyn ER x 1 week.     IR to see her Tuesday.

## 2024-06-24 NOTE — LETTER
[facility] MED SURG UNIT  [address] [address]  Dept: [phone]    June 25, 2024     Patient: Faith Oro   YOB: 1990   Date of Visit: 6/24/2024       To Whom it May Concern:    Faith Oro is under my professional care. She was seen in the hospital from 6/24/2024 to 06/25/24. She may return to work on 07/01/24 with the following limitations no heavy lifting. .    If you have any questions or concerns, please don't hesitate to call.         Sincerely,          Jose Jorge MD

## 2024-06-24 NOTE — H&P
[facility]  H&P  Name: Faith Oro 34 y.o. female I MRN: [id number]  Unit/Bed#: -01 I Date of Admission: 6/24/2024   Date of Service: 6/24/2024 I Hospital Day: 0      Assessment & Plan   * Status post embolization of uterine artery  Assessment & Plan  Patient presents for observation following uterine artery embolization for fibroids  Postprocedure developed severe pain, limited oral intake.  Per IR physician suspect postembolization syndrome  Continue symptom management with IV pain control, antiemetics, IV fluids  Discharge on Keflex or ciprofloxacin x 5 days, senna/Colace, as needed Percocet/Zofran and naprosyn ER x1 week       VTE Pharmacologic Prophylaxis: VTE Score: 0 Low Risk (Score 0-2) - Encourage Ambulation.  Code Status: Level 1 - Full Code   Discussion with family: Patient declined call to .     Anticipated Length of Stay: Patient will be admitted on an observation basis with an anticipated length of stay of less than 2 midnights secondary to pain control.    Total Time Spent on Date of Encounter in care of patient: 55 mins. This time was spent on one or more of the following: performing physical exam; counseling and coordination of care; obtaining or reviewing history; documenting in the medical record; reviewing/ordering tests, medications or procedures; communicating with other healthcare professionals and discussing with patient's family/caregivers.    Chief Complaint: Uncontrolled pain postprocedure    History of Present Illness:  Faith Oro is a 34 y.o. female with a PMH of uterine fibroids, menorrhagia who presents with pain following uterine artery embolization.    Patient presents for observation admission following uterine artery embolization for uterine fibroids.  Patient developed severe pain and limited oral intake.  Patient likely with postembolization syndrome.  IR recommends overnight observation for IV pain control,  antiemetics and IV fluids.  Patient does note some improvement in her symptoms since receiving IV medications.  Denies chest pain/palpitations, shortness of breath, nausea/vomiting.    Review of Systems:  Review of Systems   Constitutional:  Negative for chills, fatigue, fever and unexpected weight change.   HENT:  Negative for congestion, sore throat and trouble swallowing.    Eyes:  Negative for photophobia, pain and visual disturbance.   Respiratory:  Negative for cough, shortness of breath and wheezing.    Cardiovascular:  Negative for chest pain, palpitations and leg swelling.   Gastrointestinal:  Negative for abdominal pain, constipation, diarrhea, nausea and vomiting.   Endocrine: Negative for polyuria.   Genitourinary:  Positive for pelvic pain. Negative for difficulty urinating, dysuria, flank pain, hematuria and urgency.   Musculoskeletal:  Negative for back pain, myalgias, neck pain and neck stiffness.   Skin:  Negative for pallor and rash.   Neurological:  Negative for dizziness, tremors, syncope, weakness, light-headedness, numbness and headaches.   Hematological:  Does not bruise/bleed easily.   Psychiatric/Behavioral:  Negative for agitation and confusion.        Past Medical and Surgical History:   No past medical history on file.    No past surgical history on file.    Meds/Allergies:  Prior to Admission medications    Medication Sig Start Date End Date Taking? Authorizing Provider   Vestura 3-0.02 MG per tablet TAKE 1 TABLET BY MOUTH EVERY DAY FOR 84 DAYS 12/7/23  Yes Historical Provider, MD     I have reviewed home medications with patient personally.    Allergies: No Known Allergies    Social History:  Marital Status: Single   Occupation:   Patient Pre-hospital Living Situation: Home  Patient Pre-hospital Level of Mobility: unable to be assessed at time of evaluation  Patient Pre-hospital Diet Restrictions:   Substance Use History:   Social History     Substance and Sexual Activity   Alcohol Use  "Never     Social History     Tobacco Use   Smoking Status Never   Smokeless Tobacco Never     Social History     Substance and Sexual Activity   Drug Use Never       Family History:  No family history on file.    Physical Exam:     Vitals:   Blood Pressure: 146/83 (06/24/24 1613)  Pulse: 70 (06/24/24 1613)  Temperature: 98.1 °F (36.7 °C) (06/24/24 1613)  Temp Source: Oral (06/24/24 1613)  Respirations: 18 (06/24/24 1613)  Height: 5' 5\" (165.1 cm) (06/24/24 1613)  Weight - Scale: 64.5 kg (142 lb 4.8 oz) (06/24/24 1613)  SpO2: 99 % (06/24/24 1613)    Physical Exam  Vitals and nursing note reviewed.   Constitutional:       Appearance: Normal appearance.      Comments: No acute distress   HENT:      Head: Normocephalic.   Eyes:      General: No scleral icterus.     Extraocular Movements: Extraocular movements intact.      Conjunctiva/sclera: Conjunctivae normal.   Cardiovascular:      Rate and Rhythm: Normal rate and regular rhythm.      Heart sounds: S1 normal and S2 normal.   Pulmonary:      Effort: Pulmonary effort is normal.      Breath sounds: Normal breath sounds. No wheezing, rhonchi or rales.   Abdominal:      General: Bowel sounds are normal.      Palpations: Abdomen is soft.      Tenderness: There is abdominal tenderness. There is no guarding or rebound.   Musculoskeletal:         General: No swelling, tenderness or deformity.      Cervical back: Normal range of motion.      Comments: Able to move upper/lower extremities bilaterally, no edema   Skin:     General: Skin is warm and dry.   Neurological:      Mental Status: She is alert and oriented to person, place, and time.          Additional Data:     Lab Results:  Results from last 7 days   Lab Units 06/24/24  0808   WBC Thousand/uL 3.96*   HEMOGLOBIN g/dL 11.6   HEMATOCRIT % 35.6   PLATELETS Thousands/uL 326     Results from last 7 days   Lab Units 06/24/24  0808   SODIUM mmol/L 135   POTASSIUM mmol/L 3.8   CHLORIDE mmol/L 102   CO2 mmol/L 27   BUN mg/dL 9 " "  CREATININE mg/dL 0.82   ANION GAP mmol/L 6   CALCIUM mg/dL 9.5   GLUCOSE RANDOM mg/dL 92     Results from last 7 days   Lab Units 06/24/24  0808   INR  0.97         No results found for: \"HGBA1C\"        Lines/Drains:  Invasive Devices       Peripheral Intravenous Line  Duration             Peripheral IV 06/24/24 Left Antecubital <1 day                        Imaging: No pertinent imaging reviewed.  IR uterine artery embolization    (Results Pending)       EKG and Other Studies Reviewed on Admission:   EKG: No EKG obtained.    ** Please Note: This note has been constructed using a voice recognition system. **        "

## 2024-06-24 NOTE — LETTER
[facility] MED SURG UNIT  [address] [address]  Dept: [phone]    June 27, 2024     Patient: Faith Oro   YOB: 1990   Date of Visit: 6/24/2024       To Whom it May Concern:    Faith Oro is under my professional care. She was seen in the hospital from 6/24/2024 to 6/25/2024. She may return to work on 7/8/2024 without limitations. If you have any questions or concerns, please don't hesitate to call.       Sincerely,          Ismael Sotelo, DO

## 2024-06-25 VITALS
RESPIRATION RATE: 16 BRPM | SYSTOLIC BLOOD PRESSURE: 135 MMHG | HEART RATE: 77 BPM | WEIGHT: 142.3 LBS | DIASTOLIC BLOOD PRESSURE: 77 MMHG | OXYGEN SATURATION: 98 % | BODY MASS INDEX: 23.71 KG/M2 | HEIGHT: 65 IN | TEMPERATURE: 98.5 F

## 2024-06-25 LAB
ANION GAP SERPL CALCULATED.3IONS-SCNC: 6 MMOL/L (ref 4–13)
BASOPHILS # BLD AUTO: 0.01 THOUSANDS/ÂΜL (ref 0–0.1)
BASOPHILS NFR BLD AUTO: 0 % (ref 0–1)
BUN SERPL-MCNC: 5 MG/DL (ref 5–25)
CALCIUM SERPL-MCNC: 8.6 MG/DL (ref 8.4–10.2)
CHLORIDE SERPL-SCNC: 103 MMOL/L (ref 96–108)
CO2 SERPL-SCNC: 23 MMOL/L (ref 21–32)
CREAT SERPL-MCNC: 0.66 MG/DL (ref 0.6–1.3)
EOSINOPHIL # BLD AUTO: 0 THOUSAND/ÂΜL (ref 0–0.61)
EOSINOPHIL NFR BLD AUTO: 0 % (ref 0–6)
ERYTHROCYTE [DISTWIDTH] IN BLOOD BY AUTOMATED COUNT: 12 % (ref 11.6–15.1)
GFR SERPL CREATININE-BSD FRML MDRD: 114 ML/MIN/1.73SQ M
GLUCOSE SERPL-MCNC: 112 MG/DL (ref 65–140)
HCT VFR BLD AUTO: 32.4 % (ref 34.8–46.1)
HGB BLD-MCNC: 10.9 G/DL (ref 11.5–15.4)
IMM GRANULOCYTES # BLD AUTO: 0.04 THOUSAND/UL (ref 0–0.2)
IMM GRANULOCYTES NFR BLD AUTO: 1 % (ref 0–2)
LYMPHOCYTES # BLD AUTO: 1.64 THOUSANDS/ÂΜL (ref 0.6–4.47)
LYMPHOCYTES NFR BLD AUTO: 19 % (ref 14–44)
MCH RBC QN AUTO: 30.4 PG (ref 26.8–34.3)
MCHC RBC AUTO-ENTMCNC: 33.6 G/DL (ref 31.4–37.4)
MCV RBC AUTO: 91 FL (ref 82–98)
MONOCYTES # BLD AUTO: 0.57 THOUSAND/ÂΜL (ref 0.17–1.22)
MONOCYTES NFR BLD AUTO: 7 % (ref 4–12)
NEUTROPHILS # BLD AUTO: 6.19 THOUSANDS/ÂΜL (ref 1.85–7.62)
NEUTS SEG NFR BLD AUTO: 73 % (ref 43–75)
NRBC BLD AUTO-RTO: 0 /100 WBCS
PLATELET # BLD AUTO: 306 THOUSANDS/UL (ref 149–390)
PMV BLD AUTO: 9.3 FL (ref 8.9–12.7)
POTASSIUM SERPL-SCNC: 3.9 MMOL/L (ref 3.5–5.3)
RBC # BLD AUTO: 3.58 MILLION/UL (ref 3.81–5.12)
SODIUM SERPL-SCNC: 132 MMOL/L (ref 135–147)
WBC # BLD AUTO: 8.45 THOUSAND/UL (ref 4.31–10.16)

## 2024-06-25 PROCEDURE — 85025 COMPLETE CBC W/AUTO DIFF WBC: CPT | Performed by: INTERNAL MEDICINE

## 2024-06-25 PROCEDURE — 80048 BASIC METABOLIC PNL TOTAL CA: CPT | Performed by: INTERNAL MEDICINE

## 2024-06-25 RX ORDER — OXYCODONE HYDROCHLORIDE AND ACETAMINOPHEN 5; 325 MG/1; MG/1
1 TABLET ORAL EVERY 4 HOURS PRN
Qty: 10 TABLET | Refills: 0 | Status: SHIPPED | OUTPATIENT
Start: 2024-06-25 | End: 2024-06-27 | Stop reason: SDUPTHER

## 2024-06-25 RX ORDER — AMOXICILLIN 250 MG
1 CAPSULE ORAL DAILY
Qty: 7 TABLET | Refills: 0 | Status: SHIPPED | OUTPATIENT
Start: 2024-06-25 | End: 2024-07-02

## 2024-06-25 RX ORDER — ONDANSETRON 4 MG/1
4 TABLET, FILM COATED ORAL EVERY 8 HOURS PRN
Qty: 20 TABLET | Refills: 0 | Status: SHIPPED | OUTPATIENT
Start: 2024-06-25

## 2024-06-25 RX ORDER — CEPHALEXIN 500 MG/1
500 CAPSULE ORAL EVERY 6 HOURS SCHEDULED
Qty: 20 CAPSULE | Refills: 0 | Status: SHIPPED | OUTPATIENT
Start: 2024-06-25 | End: 2024-06-30

## 2024-06-25 RX ORDER — KETOROLAC TROMETHAMINE 30 MG/ML
15 INJECTION, SOLUTION INTRAMUSCULAR; INTRAVENOUS 3 TIMES DAILY PRN
Status: DISCONTINUED | OUTPATIENT
Start: 2024-06-25 | End: 2024-06-25 | Stop reason: HOSPADM

## 2024-06-25 RX ORDER — NAPROXEN 500 MG/1
500 TABLET ORAL 2 TIMES DAILY WITH MEALS
Qty: 10 TABLET | Refills: 0 | Status: SHIPPED | OUTPATIENT
Start: 2024-06-25 | End: 2024-06-30

## 2024-06-25 RX ADMIN — OXYCODONE HYDROCHLORIDE 5 MG: 5 TABLET ORAL at 06:22

## 2024-06-25 RX ADMIN — HYDROMORPHONE HYDROCHLORIDE 0.5 MG: 1 INJECTION, SOLUTION INTRAMUSCULAR; INTRAVENOUS; SUBCUTANEOUS at 08:11

## 2024-06-25 RX ADMIN — OXYCODONE HYDROCHLORIDE 5 MG: 5 TABLET ORAL at 11:28

## 2024-06-25 RX ADMIN — OXYCODONE HYDROCHLORIDE 5 MG: 5 TABLET ORAL at 01:10

## 2024-06-25 RX ADMIN — HYDROMORPHONE HYDROCHLORIDE 0.5 MG: 1 INJECTION, SOLUTION INTRAMUSCULAR; INTRAVENOUS; SUBCUTANEOUS at 03:21

## 2024-06-25 RX ADMIN — SODIUM CHLORIDE 125 ML/HR: 0.9 INJECTION, SOLUTION INTRAVENOUS at 01:54

## 2024-06-25 NOTE — PLAN OF CARE
Problem: PAIN - ADULT  Goal: Verbalizes/displays adequate comfort level or baseline comfort level  Description: Interventions:  - Encourage patient to monitor pain and request assistance  - Assess pain using appropriate pain scale  - Administer analgesics based on type and severity of pain and evaluate response  - Implement non-pharmacological measures as appropriate and evaluate response  - Consider cultural and social influences on pain and pain management  - Notify physician/advanced practitioner if interventions unsuccessful or patient reports new pain  Outcome: Progressing     Problem: INFECTION - ADULT  Goal: Absence or prevention of progression during hospitalization  Description: INTERVENTIONS:  - Assess and monitor for signs and symptoms of infection  - Monitor lab/diagnostic results  - Monitor all insertion sites, i.e. indwelling lines, tubes, and drains  - Monitor endotracheal if appropriate and nasal secretions for changes in amount and color  - [place] appropriate cooling/warming therapies per order  - Administer medications as ordered  - Instruct and encourage patient and family to use good hand hygiene technique  - Identify and instruct in appropriate isolation precautions for identified infection/condition  Outcome: Progressing  Goal: Absence of fever/infection during neutropenic period  Description: INTERVENTIONS:  - Monitor WBC    Outcome: Progressing     Problem: SAFETY ADULT  Goal: Patient will remain free of falls  Description: INTERVENTIONS:  - Educate patient/family on patient safety including physical limitations  - Instruct patient to call for assistance with activity   - Consult OT/PT to assist with strengthening/mobility   - Keep Call bell within reach  - Keep bed low and locked with side rails adjusted as appropriate  - Keep care items and personal belongings within reach  - Initiate and maintain comfort rounds  - Make Fall Risk Sign visible to staff  - Offer Toileting every 3 Hours,  in advance of need  - Initiate/Maintain bed alarm  - Obtain necessary fall risk management equipment: non slip socks  - Apply yellow socks and bracelet for high fall risk patients  - Consider moving patient to room near nurses station  Outcome: Progressing  Goal: Maintain or return to baseline ADL function  Description: INTERVENTIONS:  -  Assess patient's ability to carry out ADLs; assess patient's baseline for ADL function and identify physical deficits which impact ability to perform ADLs (bathing, care of mouth/teeth, toileting, grooming, dressing, etc.)  - Assess/evaluate cause of self-care deficits   - Assess range of motion  - Assess patient's mobility; develop plan if impaired  - Assess patient's need for assistive devices and provide as appropriate  - Encourage maximum independence but intervene and supervise when necessary  - Involve family in performance of ADLs  - Assess for home care needs following discharge   - Consider OT consult to assist with ADL evaluation and planning for discharge  - Provide patient education as appropriate  Outcome: Progressing  Goal: Maintains/Returns to pre admission functional level  Description: INTERVENTIONS:  - Perform AM-PAC 6 Click Basic Mobility/ Daily Activity assessment daily.  - Set and communicate daily mobility goal to care team and patient/family/caregiver.   - Collaborate with rehabilitation services on mobility goals if consulted  - Perform Range of Motion 3 times a day.  - Reposition patient every 3 hours.  - Dangle patient 3 times a day  - Stand patient 3 times a day  - Ambulate patient 3 times a day  - Out of bed to chair 3 times a day   - Out of bed for meals 3 times a day  - Out of bed for toileting  - Record patient progress and toleration of activity level   Outcome: Progressing     Problem: DISCHARGE PLANNING  Goal: Discharge to home or other facility with appropriate resources  Description: INTERVENTIONS:  - Identify barriers to discharge w/patient and  caregiver  - Arrange for needed discharge resources and transportation as appropriate  - Identify discharge learning needs (meds, wound care, etc.)  - Arrange for interpretive services to assist at discharge as needed  - Refer to Case Management Department for coordinating discharge planning if the patient needs post-hospital services based on physician/advanced practitioner order or complex needs related to functional status, cognitive ability, or social support system  Outcome: Progressing     Problem: Knowledge Deficit  Goal: Patient/family/caregiver demonstrates understanding of disease process, treatment plan, medications, and discharge instructions  Description: Complete learning assessment and assess knowledge base.  Interventions:  - Provide teaching at level of understanding  - Provide teaching via preferred learning methods  Outcome: Progressing

## 2024-06-25 NOTE — UTILIZATION REVIEW
Initial Clinical Review    Elective Outpatient IR  procedure  Post procedure with pain requiring IV analgesia and limited oral intake  Age/Sex: 34 y.o. female  Surgery Date: 6/24/24  Procedure: IR Uterine embolization   Anesthesia: conscious sedation and local  Operative Findings: R CFA 5F sheath, closed with Vascade  Bilateral uterine artery embolized to stasis.    POD#1 Progress Note: 6/25/24    Admission Orders: Date/Time/Statement:  6/24/24 1525 outpatient no charge bed   No orders of the defined types were placed in this encounter.      Vital Signs (last 3 days)       Date/Time Temp Pulse Resp BP MAP (mmHg) SpO2 Calculated FIO2 (%) - Nasal Cannula Nasal Cannula O2 Flow Rate (L/min) O2 Device [place] Coma Scale Score Pain    06/25/24 0321 -- -- -- -- -- -- -- -- -- -- 9 06/25/24 0110 -- -- -- -- -- -- -- -- -- -- 9 06/24/24 2325 -- -- -- -- -- -- -- -- -- -- 10 - Worst Possible Pain    06/24/24 2059 -- -- -- -- -- -- -- -- -- -- 9 06/24/24 2006 -- -- -- -- -- 98 % -- -- None (Room air) 15 7    06/24/24 1953 -- -- -- -- -- -- -- -- -- -- 7 06/24/24 19:47:12 98.3 °F (36.8 °C) 75 -- 137/72 94 98 % -- -- -- -- --    06/24/24 1617 -- -- -- -- -- -- -- -- -- -- 10 - Worst Possible Pain    06/24/24 1615 -- -- -- -- -- -- -- -- None (Room air) 15 10 - Worst Possible Pain    06/24/24 16:13:41 98.1 °F (36.7 °C) 70 18 146/83 104 99 % -- -- -- -- --    06/24/24 1533 -- -- -- -- -- 100 % -- -- None (Room air) -- 10 - Worst Possible Pain    06/24/24 1325 -- 76 18 150/80 -- 100 % -- -- None (Room air) -- --    06/24/24 1257 -- 77 20 152/92 -- 100 % -- -- None (Room air) -- 10 - Worst Possible Pain    06/24/24 1255 -- -- -- -- -- -- -- -- -- -- 10 - Worst Possible Pain    06/24/24 1228 -- 72 16 144/83 -- 98 % -- -- None (Room air) -- 6    06/24/24 1212 -- 74 -- 143/77 -- 96 % -- -- None (Room air) -- 10 - Worst Possible Pain    06/24/24 1202 -- -- -- -- -- -- -- -- -- -- 10 - Worst Possible Pain    06/24/24 1140 --  75 18 132/72 -- -- -- -- -- -- --    06/24/24 1125 -- 72 18 139/75 -- -- -- -- -- -- --    06/24/24 1120 -- 72 -- -- -- -- -- -- -- -- --    06/24/24 1110 -- 76 22 132/85 -- 100 % -- -- None (Room air) 15 No Pain    06/24/24 1100 -- -- 22 -- -- -- -- -- -- -- --    06/24/24 1058 -- 75 17 132/85 -- 100 % -- -- None (Room air) -- --    06/24/24 1053 -- 75 18 137/82 -- 100 % -- -- None (Room air) -- --    06/24/24 1048 -- 70 18 143/86 -- 100 % -- -- None (Room air) -- --    06/24/24 1043 -- 74 19 142/89 -- 100 % 32 3 L/min Nasal cannula -- --    06/24/24 1038 -- 70 15 143/84 -- 100 % 32 3 L/min Nasal cannula -- --    06/24/24 1033 -- 70 15 136/82 -- 100 % 32 3 L/min Nasal cannula -- --    06/24/24 1028 -- 67 17 132/81 -- 100 % 32 3 L/min Nasal cannula -- --    06/24/24 1023 -- 74 14 141/85 -- 100 % 32 3 L/min Nasal cannula -- --    06/24/24 1018 -- 72 15 140/84 -- 100 % 32 3 L/min Nasal cannula -- --    06/24/24 1013 -- 71 13 132/80 -- 100 % 32 3 L/min Nasal cannula -- --    06/24/24 1008 -- 74 14 138/82 -- 100 % 32 3 L/min Nasal cannula -- --    06/24/24 1003 -- 79 15 136/83 -- 100 % 32 3 L/min Nasal cannula -- --    06/24/24 1000 -- -- -- -- -- -- 32 3 L/min Nasal cannula -- --    06/24/24 0958 -- 75 15 136/82 -- 100 % 32 3 L/min Nasal cannula -- --    06/24/24 0953 -- 74 16 136/79 -- 100 % 32 3 L/min Nasal cannula -- --    06/24/24 0948 -- 81 17 138/79 -- 100 % 32 3 L/min Nasal cannula -- --    06/24/24 0944 -- -- -- -- -- -- -- -- -- -- Med Not Given for Pain - for MAR use only    06/24/24 0943 -- 81 20 135/79 -- 100 % 32 3 L/min Nasal cannula -- --    06/24/24 0938 -- 83 17 126/76 -- 100 % 32 3 L/min Nasal cannula -- --    06/24/24 0933 -- 81 18 141/80 -- 100 % 32 3 L/min Nasal cannula -- --    06/24/24 0928 -- 78 13 131/76 -- 100 % 32 3 L/min Nasal cannula -- --    06/24/24 0923 -- 79 14 144/80 -- 100 % 32 3 L/min Nasal cannula -- --    06/24/24 0918 -- 75 13 133/74 -- 100 % 32 3 L/min Nasal cannula -- --     06/24/24 0913 -- 75 15 146/77 -- 97 % -- -- None (Room air) -- --    06/24/24 0908 -- 74 19 135/79 -- 100 % -- -- None (Room air) -- --    06/24/24 0904 -- 76 -- 139/83 -- 100 % -- -- None (Room air) -- --    06/24/24 0800 98.5 °F (36.9 °C) 79 20 133/72 -- 100 % -- -- None (Room air) -- No Pain          Weight (last 2 days)       Date/Time Weight    06/24/24 16:13:41 64.5 (142.3)    06/24/24 0800 64.5 (142.3)            Pertinent Labs/Diagnostic Test Results:   Radiology:  IR uterine artery embolization    (Results Pending)     Cardiology:  No orders to display     GI:  No orders to display         Results from last 7 days   Lab Units 06/25/24  0452 06/24/24  0808   WBC Thousand/uL 8.45 3.96*   HEMOGLOBIN g/dL 10.9* 11.6   HEMATOCRIT % 32.4* 35.6   PLATELETS Thousands/uL 306 326   TOTAL NEUT ABS Thousands/µL 6.19  --      Results from last 7 days   Lab Units 06/25/24  0452 06/24/24  0808   SODIUM mmol/L 132* 135   POTASSIUM mmol/L 3.9 3.8   CHLORIDE mmol/L 103 102   CO2 mmol/L 23 27   ANION GAP mmol/L 6 6   BUN mg/dL 5 9   CREATININE mg/dL 0.66 0.82   EGFR ml/min/1.73sq m 114 92   CALCIUM mg/dL 8.6 9.5     Results from last 7 days   Lab Units 06/25/24  0452 06/24/24  0808   GLUCOSE RANDOM mg/dL 112 92         Results from last 7 days   Lab Units 06/24/24  0808   PROTIME seconds 13.3   INR  0.97         Diet: Regular  Mobility: Ambulation in room, progressing to hallway with assistive device three times daily, with assistance/supervision PRN.   DVT Prophylaxis: ambulatory     Medications/Pain Control:   Scheduled Medications:  fentaNYL, 25 mcg, Transdermal, Q72H      Continuous IV Infusions:  sodium chloride, 125 mL/hr, Intravenous, Continuous      PRN Meds:  HYDROmorphone (DILAUDID) injection 0.5 mg - x 4 6/24.  X 1 6/25  Dose: 0.5 mg  Freq: Every 3 hours PRN Route: IV  PRN Reason: severe pain  Start: 06/24/24 1154    oxyCODONE (ROXICODONE) IR tablet 5 mg - x 3 6/24.  X 12 6/25  Dose: 5 mg  Freq: Every 4 hours PRN  Route: PO  PRN Reason: moderate pain  Start: 06/24/24 1149    Network Utilization Review Department  ATTENTION: Please call with any questions or concerns to [phone] and carefully listen to the prompts so that you are directed to the right person. All voicemails are confidential.   For Discharge needs, contact Care Management DC Support Team at [phone] opt. 2  Send all requests for admission clinical reviews, approved or denied determinations and any other requests to dedicated fax number below belonging to the campus where the patient is receiving treatment. List of dedicated fax numbers for the Facilities:  FACILITY NAME UR FAX NUMBER   ADMISSION DENIALS (Administrative/Medical Necessity) [phone]   DISCHARGE SUPPORT TEAM (NETWORK) [phone]   PARENT CHILD HEALTH (Maternity/NICU/Pediatrics) [phone]   [facility] [phone]   [facility] [phone]   [facility] [phone]   [facility] [phone]   [facility] [phone]   [facility] [phone]   [facility] [phone]   [facility] [phone]   [facility] [phone]   Replaced by [facility] [phone]   [facility] [phone]   [facility] [phone]

## 2024-06-25 NOTE — PLAN OF CARE
Problem: PAIN - ADULT  Goal: Verbalizes/displays adequate comfort level or baseline comfort level  Description: Interventions:  - Encourage patient to monitor pain and request assistance  - Assess pain using appropriate pain scale  - Administer analgesics based on type and severity of pain and evaluate response  - Implement non-pharmacological measures as appropriate and evaluate response  - Consider cultural and social influences on pain and pain management  - Notify physician/advanced practitioner if interventions unsuccessful or patient reports new pain  Outcome: Progressing     Problem: INFECTION - ADULT  Goal: Absence or prevention of progression during hospitalization  Description: INTERVENTIONS:  - Assess and monitor for signs and symptoms of infection  - Monitor lab/diagnostic results  - Monitor all insertion sites, i.e. indwelling lines, tubes, and drains  - Monitor endotracheal if appropriate and nasal secretions for changes in amount and color  - [place] appropriate cooling/warming therapies per order  - Administer medications as ordered  - Instruct and encourage patient and family to use good hand hygiene technique  - Identify and instruct in appropriate isolation precautions for identified infection/condition  Outcome: Progressing  Goal: Absence of fever/infection during neutropenic period  Description: INTERVENTIONS:  - Monitor WBC    Outcome: Progressing     Problem: SAFETY ADULT  Goal: Patient will remain free of falls  Description: INTERVENTIONS:  - Educate patient/family on patient safety including physical limitations  - Instruct patient to call for assistance with activity   - Consult OT/PT to assist with strengthening/mobility   - Keep Call bell within reach  - Keep bed low and locked with side rails adjusted as appropriate  - Keep care items and personal belongings within reach  - Initiate and maintain comfort rounds  - Make Fall Risk Sign visible to staff  - Offer Toileting every 2 Hours,  in advance of need  - Initiate/Maintain bed/chair alarm  - Obtain necessary fall risk management equipment  - Apply yellow socks and bracelet for high fall risk patients  - Consider moving patient to room near nurses station  Outcome: Progressing  Goal: Maintain or return to baseline ADL function  Description: INTERVENTIONS:  -  Assess patient's ability to carry out ADLs; assess patient's baseline for ADL function and identify physical deficits which impact ability to perform ADLs (bathing, care of mouth/teeth, toileting, grooming, dressing, etc.)  - Assess/evaluate cause of self-care deficits   - Assess range of motion  - Assess patient's mobility; develop plan if impaired  - Assess patient's need for assistive devices and provide as appropriate  - Encourage maximum independence but intervene and supervise when necessary  - Involve family in performance of ADLs  - Assess for home care needs following discharge   - Consider OT consult to assist with ADL evaluation and planning for discharge  - Provide patient education as appropriate  Outcome: Progressing  Goal: Maintains/Returns to pre admission functional level  Description: INTERVENTIONS:  - Perform AM-PAC 6 Click Basic Mobility/ Daily Activity assessment daily.  - Set and communicate daily mobility goal to care team and patient/family/caregiver.   - Collaborate with rehabilitation services on mobility goals if consulted  - Perform Range of Motion 3 times a day.  - Reposition patient every 2 hours.  - Dangle patient 3 times a day  - Stand patient 3 times a day  - Ambulate patient 3 times a day  - Out of bed to chair 3 times a day   - Out of bed for meals 3 times a day  - Out of bed for toileting  - Record patient progress and toleration of activity level   Outcome: Progressing     Problem: DISCHARGE PLANNING  Goal: Discharge to home or other facility with appropriate resources  Description: INTERVENTIONS:  - Identify barriers to discharge w/patient and  caregiver  - Arrange for needed discharge resources and transportation as appropriate  - Identify discharge learning needs (meds, wound care, etc.)  - Arrange for interpretive services to assist at discharge as needed  - Refer to Case Management Department for coordinating discharge planning if the patient needs post-hospital services based on physician/advanced practitioner order or complex needs related to functional status, cognitive ability, or social support system  Outcome: Progressing     Problem: Knowledge Deficit  Goal: Patient/family/caregiver demonstrates understanding of disease process, treatment plan, medications, and discharge instructions  Description: Complete learning assessment and assess knowledge base.  Interventions:  - Provide teaching at level of understanding  - Provide teaching via preferred learning methods  Outcome: Progressing

## 2024-06-25 NOTE — PROGRESS NOTES
Patient post UAE yesterday.  This morning patient in bed sitting up and looking comfortable.  Groin showed intact dressing with no hematoma and good pulse. Mildly tender.  Patient ate and was a little nauseous. Walked around and had no urinary issues.    Patient is OK to be discharged home and follow up with IR outpatient .

## 2024-06-25 NOTE — DISCHARGE INSTR - AVS FIRST PAGE
Take naproxen 2 times a day  Take Percocet as needed  Take Senokot as needed for constipation, take Zofran as needed for nausea  Take Keflex 4 times a day for 5 days  Stay hydrated

## 2024-06-26 ENCOUNTER — TELEPHONE (OUTPATIENT)
Dept: INTERVENTIONAL RADIOLOGY/VASCULAR | Facility: CLINIC | Age: 34
End: 2024-06-26

## 2024-06-26 NOTE — TELEPHONE ENCOUNTER
Spoke with Ms. Oro.     Post UAE 2 days. Doing well, no fever, tolerating PO and had BM. Pain 4/10, mostly in legs which is similar to when she gets her periods. Not taking narcotics, only naprosyn. Taking Abx and stool softener.     Will follow up with her on Friday

## 2024-06-27 ENCOUNTER — TELEPHONE (OUTPATIENT)
Dept: INTERVENTIONAL RADIOLOGY/VASCULAR | Facility: CLINIC | Age: 34
End: 2024-06-27

## 2024-06-27 DIAGNOSIS — Z98.890 STATUS POST EMBOLIZATION OF UTERINE ARTERY: ICD-10-CM

## 2024-06-27 RX ORDER — OXYCODONE HYDROCHLORIDE AND ACETAMINOPHEN 5; 325 MG/1; MG/1
2 TABLET ORAL EVERY 6 HOURS PRN
Qty: 20 TABLET | Refills: 0 | Status: SHIPPED | OUTPATIENT
Start: 2024-06-27

## 2024-06-27 NOTE — TELEPHONE ENCOUNTER
Patient called regarding uncontrolled pain.  Discussed current medication regimen.  She reports not taking the pain medication, narcotic, as ordered on a scheduled basis.  Advised for patient to continue taking Naprosyn as ordered and for the next 24 hours taking the oxycodone as prescribed on a routine basis until her pain is controlled.  After the next 24 hours she should assess for her pain and take the medication as needed.  New prescription for oxycodone sent to pharmacy on file.  We also discussed the use of a heating pad for management of symptoms.  Patient is aware to call interventional radiology for worsening pain, discomfort, uncontrolled pain, fever, chills.    MELISSA Bansal

## 2024-06-28 ENCOUNTER — NURSE TRIAGE (OUTPATIENT)
Dept: PRIMARY CARE | Facility: CLINIC | Age: 34
End: 2024-06-28
Payer: COMMERCIAL

## 2024-06-28 ENCOUNTER — NURSE TRIAGE (OUTPATIENT)
Age: 34
End: 2024-06-28

## 2024-06-28 DIAGNOSIS — Z98.890 STATUS POST EMBOLIZATION OF UTERINE ARTERY: ICD-10-CM

## 2024-06-28 RX ORDER — NAPROXEN 500 MG/1
500 TABLET ORAL 2 TIMES DAILY WITH MEALS
Qty: 10 TABLET | Refills: 0 | Status: SHIPPED | OUTPATIENT
Start: 2024-06-28 | End: 2024-07-08 | Stop reason: ALTCHOICE

## 2024-06-28 NOTE — TELEPHONE ENCOUNTER
Synopsis:  Patient calling w/ c/o running out of her 5-days of Naproxen she was given post-myomectomy at [facility]. She had called  for a refill but they told her to call her PCP. Advised will provided a further 5-day course, but can also take Tylenol ES with it as adjuvant, as she is wishing to avoid narcotics. Script sent. Patient was very appreciative.  Disposition:  Home Care  Care Advice:  Care Advice Given       No Care Advice given for this encounter.          Orders Placed This Encounter:  Orders Placed This Encounter    naproxen (EC NAPROSYN) 500 mg EC tablet     Sig: Take 1 tablet (500 mg total) by mouth 2 (two) times a day with meals for 5 days.     Dispense:  10 tablet     Refill:  0

## 2024-06-28 NOTE — TELEPHONE ENCOUNTER
Patient called in states she was informed by IR to call our office to have one of our providers speak with the pharmacy regarding refilling her naproxen. Reviewed chart and advised we did not order naproxen for patient and she is not established with any of our physicians. Patient has a PCP and will reach out to PCP for naproxen since interventional radiology office is currently closed. Reports she has been calling them all day and no one got back to her and when they did they told her to call urology, states she really does not want to take the oxycodone.   She will call PCP. Patient sounded like she was in pain and she began to cry when I asked her if she was experiencing pain, reviewed ED precautions with patient. She will call her PCP.   Reason for Disposition   Nursing judgment    Protocols used: No Guideline or Reference Available-ADULT-AH

## 2024-06-29 ENCOUNTER — NURSE TRIAGE (OUTPATIENT)
Dept: PRIMARY CARE | Facility: CLINIC | Age: 34
End: 2024-06-29
Payer: COMMERCIAL

## 2024-06-29 NOTE — TELEPHONE ENCOUNTER
Synopsis:  Patient calling w/ clarifying question from yesterday's call about different Tylenol strengths allowed to be taken with her Naproxen. Reviewed strengths. She was appreciative.  Disposition:  Information or Advice Only Call  Care Advice:  Care Advice Given       No Care Advice given for this encounter.          Orders Placed This Encounter:  No orders of the defined types were placed in this encounter.

## 2024-07-01 ENCOUNTER — OFFICE VISIT (OUTPATIENT)
Dept: PRIMARY CARE | Facility: CLINIC | Age: 34
End: 2024-07-01
Payer: COMMERCIAL

## 2024-07-01 VITALS
RESPIRATION RATE: 16 BRPM | BODY MASS INDEX: 24.59 KG/M2 | DIASTOLIC BLOOD PRESSURE: 72 MMHG | TEMPERATURE: 98.1 F | WEIGHT: 147.6 LBS | HEIGHT: 65 IN | HEART RATE: 83 BPM | SYSTOLIC BLOOD PRESSURE: 110 MMHG | OXYGEN SATURATION: 98 %

## 2024-07-01 DIAGNOSIS — R07.89 CHEST PRESSURE: Primary | ICD-10-CM

## 2024-07-01 PROCEDURE — 93000 ELECTROCARDIOGRAM COMPLETE: CPT | Performed by: FAMILY MEDICINE

## 2024-07-01 PROCEDURE — 3008F BODY MASS INDEX DOCD: CPT | Performed by: FAMILY MEDICINE

## 2024-07-01 PROCEDURE — 99213 OFFICE O/P EST LOW 20 MIN: CPT | Performed by: FAMILY MEDICINE

## 2024-07-01 RX ORDER — OMEPRAZOLE 40 MG/1
40 CAPSULE, DELAYED RELEASE ORAL
Qty: 30 CAPSULE | Refills: 1 | Status: SHIPPED | OUTPATIENT
Start: 2024-07-01 | End: 2024-09-27 | Stop reason: SDUPTHER

## 2024-07-01 NOTE — PROGRESS NOTES
Subjective      Patient ID: Lazara Ferguson is a 34 y.o. female.      HPI    The following have been reviewed and updated as appropriate in this visit:       Tobacco  Allergies  Meds  Problems  Med Hx  Surg Hx  Fam Hx       Pt of Adenike Durham presents for chest burning     She had uterine artery ablation 6 days ago     Was taking a llot of naprosyn for pain     Starting past few days pain in upper chest ? Espohagus   Hurts more to lay back  pressure in chest   Burning pressure pain   Not sinus congestion no uri congestion no fever   Not SOB   no palpitations     Neverh ad this before           Lab Results   Component Value Date    WBC 4.52 11/13/2023    HGB 11.5 (L) 11/13/2023    HCT 35.0 11/13/2023     11/13/2023    CHOL 192 11/07/2023    TRIG 109 11/07/2023    HDL 64 11/07/2023    ALT 7 11/07/2023    AST 16 11/07/2023     11/13/2023    K 3.4 (L) 11/13/2023     11/13/2023    CREATININE 0.7 11/13/2023    BUN 8 11/13/2023    CO2 26 11/13/2023    TSH 0.731 12/22/2023    HGBA1C 5.4 11/07/2023    LDLCALC 109 (H) 11/07/2023       Review of Systems      Current Outpatient Medications:     magnesium 200 mg tablet, Take by mouth., Disp: , Rfl:     omeprazole (PriLOSEC) 40 mg capsule, Take 1 capsule (40 mg total) by mouth daily before breakfast., Disp: 30 capsule, Rfl: 1    ethinyl estradiol/drospirenone (KIMBER, 28, ORAL), Take by mouth., Disp: , Rfl:     naproxen (EC NAPROSYN) 500 mg EC tablet, Take 1 tablet (500 mg total) by mouth 2 (two) times a day with meals for 5 days. (Patient not taking: Reported on 7/1/2024), Disp: 10 tablet, Rfl: 0  Patient has no known allergies.  Past Medical History:   Diagnosis Date    Screening for breast cancer     breast ultrasound in 2007/2008 due to mass in right breast     Past Surgical History:   Procedure Laterality Date    BREAST BIOPSY Right 2007/2008    benign per patient report    BREAST SURGERY Right [id number]    right side benign tumor removed  "(pathology unavailable for review)    CERVICAL BIOPSY  W/ LOOP ELECTRODE EXCISION      due to abnormal PAP     Objective     Vitals:    07/01/24 1531   BP: 110/72   BP Location: Left upper arm   Patient Position: Sitting   Pulse: 83   Resp: 16   Temp: 36.7 °C (98.1 °F)   TempSrc: Temporal   SpO2: 98%   Weight: 67 kg (147 lb 9.6 oz)   Height: 1.651 m (5' 5\")       Body mass index is 24.56 kg/m².    Physical Exam  Constitutional:       General: She is not in acute distress.     Appearance: She is well-developed and normal weight. She is not ill-appearing, toxic-appearing or diaphoretic.   HENT:      Head: Normocephalic and atraumatic.   Neck:      Thyroid: No thyromegaly.   Cardiovascular:      Rate and Rhythm: Normal rate and regular rhythm.      Heart sounds: Normal heart sounds. No murmur heard.     No friction rub. No gallop.   Pulmonary:      Effort: Pulmonary effort is normal. No respiratory distress.      Breath sounds: Normal breath sounds. No stridor. No wheezing, rhonchi or rales.   Chest:      Chest wall: No tenderness.      Comments: Discomfort anterior mid upper chest   Abdominal:      General: There is no distension.      Palpations: There is no mass.      Tenderness: There is no abdominal tenderness. There is no guarding or rebound.      Hernia: No hernia is present.   Musculoskeletal:      Cervical back: Normal range of motion and neck supple.      Right lower leg: No edema.      Left lower leg: No edema.   Lymphadenopathy:      Cervical: No cervical adenopathy.   Neurological:      Mental Status: She is alert and oriented to person, place, and time.   Psychiatric:         Mood and Affect: Mood normal.         Behavior: Behavior normal.         Thought Content: Thought content normal.         Judgment: Judgment normal.         Assessment/Plan   Problem List Items Addressed This Visit    None  Visit Diagnoses       Chest pressure    -  Primary    Relevant Orders    ECG 12 LEAD OFFICE PERFORMED " (Completed)          Disc chest pressure burning   May be gastritis esophagitis reflux related to naprosyn   Stopped naprosyn   Take omeprazole     Disc history of recent uterine artery ablation risk of pulmonary embolism   Currently no sign of PE - EKG NSR no acute changest no tachycardia no SOB no hyposia and cp is not pleuretic     But dw pt if she has increase in CP  or any SOB she must go to ER for CTA to rule out PE   Also disc if sx not improving quickly with few doses of omeprazole let me know         There are no Patient Instructions on file for this visit.    Lalita Lyons MD

## 2024-07-08 ENCOUNTER — OFFICE VISIT (OUTPATIENT)
Dept: PRIMARY CARE | Facility: CLINIC | Age: 34
End: 2024-07-08
Payer: COMMERCIAL

## 2024-07-08 VITALS
HEART RATE: 82 BPM | WEIGHT: 144.6 LBS | BODY MASS INDEX: 24.06 KG/M2 | SYSTOLIC BLOOD PRESSURE: 122 MMHG | TEMPERATURE: 97.8 F | OXYGEN SATURATION: 98 % | RESPIRATION RATE: 18 BRPM | DIASTOLIC BLOOD PRESSURE: 70 MMHG

## 2024-07-08 DIAGNOSIS — R39.198 URINARY DYSFUNCTION: Primary | ICD-10-CM

## 2024-07-08 LAB
BILIRUBIN, POC: NEGATIVE
BLOOD URINE, POC: POSITIVE
CLARITY, POC: CLEAR
COLOR, POC: ABNORMAL
EXPIRATION DATE: ABNORMAL
GLUCOSE URINE, POC: NEGATIVE
KETONES, POC: NEGATIVE
LEUKOCYTE EST, POC: ABNORMAL
Lab: ABNORMAL
NITRITE, POC: NEGATIVE
PH, POC: 6
POCT MANUFACTURER: ABNORMAL
PROTEIN, POC: ABNORMAL
SPECIFIC GRAVITY, POC: 1.03
UROBILINOGEN, POC: 0.2

## 2024-07-08 PROCEDURE — 3008F BODY MASS INDEX DOCD: CPT | Performed by: NURSE PRACTITIONER

## 2024-07-08 PROCEDURE — 99214 OFFICE O/P EST MOD 30 MIN: CPT | Performed by: NURSE PRACTITIONER

## 2024-07-08 PROCEDURE — 81002 URINALYSIS NONAUTO W/O SCOPE: CPT | Performed by: NURSE PRACTITIONER

## 2024-07-08 ASSESSMENT — ENCOUNTER SYMPTOMS
GASTROINTESTINAL NEGATIVE: 1
ADENOPATHY: 0
BRUISES/BLEEDS EASILY: 0
CARDIOVASCULAR NEGATIVE: 1
CONSTITUTIONAL NEGATIVE: 1
MUSCULOSKELETAL NEGATIVE: 1
RESPIRATORY NEGATIVE: 1
NEUROLOGICAL NEGATIVE: 1

## 2024-07-08 NOTE — PROGRESS NOTES
Subjective      Patient ID: Lazara Ferguson is a 34 y.o. female.  1990      Patient presents for a follow up.  On 6/24 with had a uterine artery embolization for fibroids at [facility]  She was admitted overnight for 1 night due to pain.  She has been taking naproxen and then developed some head/chest pressure and she was seen last week by Dr. Lyons and advised to take omeprazole.  She reports all symptoms and pain has resolved, but now she does not feel the urge to urinate.  She is keeping a schedule and she is able to empty her bladder.  She did not have a babin catheter but just feels something is off and she is still having some vaginal discharge.  She has not scheduled a follow up with IR yet.         The following have been reviewed and updated as appropriate in this visit:   Tobacco  Allergies  Meds  Med Hx  Surg Hx  Fam Hx  Soc Hx      Review of Systems   Constitutional: Negative.    HENT: Negative.     Respiratory: Negative.     Cardiovascular: Negative.    Gastrointestinal: Negative.    Genitourinary:  Positive for vaginal discharge.   Musculoskeletal: Negative.    Neurological: Negative.    Hematological:  Negative for adenopathy. Does not bruise/bleed easily.     Patient Active Problem List   Diagnosis   (none) - all problems resolved or deleted      Past Medical History:   Diagnosis Date    Screening for breast cancer     breast ultrasound in 2007/2008 due to mass in right breast     Past Surgical History:   Procedure Laterality Date    BREAST BIOPSY Right 2007/2008    benign per patient report    BREAST SURGERY Right [id number]    right side benign tumor removed (pathology unavailable for review)    CERVICAL BIOPSY  W/ LOOP ELECTRODE EXCISION      due to abnormal PAP    UTERINE ARTERY EMBOLIZATION  06/24/2024     Social History     Socioeconomic History    Marital status: Single     Spouse name: None    Number of children: None    Years of education: None    Highest education level: None    Tobacco Use    Smoking status: Never    Smokeless tobacco: Never   Vaping Use    Vaping Use: Never used   Substance and Sexual Activity    Alcohol use: Yes     Comment: social     Drug use: Never    Sexual activity: Yes     Birth control/protection: Pill     Social Determinants of Health     Food Insecurity: No Food Insecurity (11/13/2023)    Hunger Vital Sign     Worried About Running Out of Food in the Last Year: Never true     Ran Out of Food in the Last Year: Never true     Objective     Vitals:    07/08/24 1139   BP: 122/70   BP Location: Right upper arm   Patient Position: Sitting   Pulse: 82   Resp: 18   Temp: 36.6 °C (97.8 °F)   TempSrc: Temporal   SpO2: 98%   Weight: 65.6 kg (144 lb 9.6 oz)     Body mass index is 24.06 kg/m².  Current Outpatient Medications   Medication Sig Dispense Refill    magnesium 200 mg tablet Take by mouth.      omeprazole (PriLOSEC) 40 mg capsule Take 1 capsule (40 mg total) by mouth daily before breakfast. 30 capsule 1     No current facility-administered medications for this visit.       Physical Exam  Constitutional:       Appearance: Normal appearance.   HENT:      Head: Normocephalic and atraumatic.   Eyes:      Extraocular Movements: Extraocular movements intact.      Conjunctiva/sclera: Conjunctivae normal.   Cardiovascular:      Rate and Rhythm: Normal rate and regular rhythm.      Pulses: Normal pulses.      Heart sounds: Normal heart sounds.   Pulmonary:      Effort: Pulmonary effort is normal.      Breath sounds: Normal breath sounds.   Abdominal:      Palpations: Abdomen is soft.      Tenderness: There is no abdominal tenderness. There is no right CVA tenderness or left CVA tenderness.   Musculoskeletal:         General: Normal range of motion.      Cervical back: Normal range of motion and neck supple.   Skin:     General: Skin is warm and dry.   Neurological:      General: No focal deficit present.      Mental Status: She is alert and oriented to person, place, and  time.   Psychiatric:         Mood and Affect: Mood normal.         Behavior: Behavior normal.         Thought Content: Thought content normal.         Judgment: Judgment normal.         Assessment/Plan     Problem List Items Addressed This Visit    None  Visit Diagnoses       Urinary dysfunction    -  Primary    Relevant Orders    POCT urinalysis dipstick (Completed)    Urinalysis with microscopic    Urine culture (clean catch)          Etiology unclear?  U/A dip has blood and leukocytes but may be from vaginal discharge; will send out urine culture and she will also let IR know of her symptoms.  May need to consider a urology consult.

## 2024-07-09 ENCOUNTER — TELEPHONE (OUTPATIENT)
Dept: OBGYN CLINIC | Facility: CLINIC | Age: 34
End: 2024-07-09

## 2024-07-09 LAB
APPEARANCE UR: ABNORMAL
BACTERIA #/AREA URNS HPF: ABNORMAL /[HPF]
BACTERIA UR CULT: NO GROWTH
BACTERIA UR CULT: NORMAL
BILIRUB UR QL STRIP: NEGATIVE
CASTS URNS QL MICRO: ABNORMAL /LPF
COLOR UR: YELLOW
EPI CELLS #/AREA URNS HPF: >10 /HPF (ref 0–10)
GLUCOSE UR QL STRIP: NEGATIVE
HGB UR QL STRIP: ABNORMAL
KETONES UR QL STRIP: ABNORMAL
LEUKOCYTE ESTERASE UR QL STRIP: ABNORMAL
MICRO URNS: ABNORMAL
NITRITE UR QL STRIP: NEGATIVE
PH UR STRIP: 6 [PH] (ref 5–7.5)
PROT UR QL STRIP: ABNORMAL
RBC #/AREA URNS HPF: ABNORMAL /HPF (ref 0–2)
SP GR UR STRIP: 1.03 (ref 1–1.03)
UROBILINOGEN UR STRIP-MCNC: 1 MG/DL (ref 0.2–1)
WBC #/AREA URNS HPF: ABNORMAL /HPF (ref 0–5)

## 2024-07-12 ENCOUNTER — TELEPHONE (OUTPATIENT)
Dept: OBGYN CLINIC | Facility: CLINIC | Age: 34
End: 2024-07-12

## 2024-07-16 ENCOUNTER — HOSPITAL ENCOUNTER (OUTPATIENT)
Dept: RADIOLOGY | Age: 34
Discharge: HOME | End: 2024-07-16
Attending: PHYSICIAN ASSISTANT
Payer: COMMERCIAL

## 2024-07-16 DIAGNOSIS — M23.91 UNSPECIFIED INTERNAL DERANGEMENT OF RIGHT KNEE: ICD-10-CM

## 2024-08-02 ENCOUNTER — TELEPHONE (OUTPATIENT)
Dept: OBGYN CLINIC | Facility: CLINIC | Age: 34
End: 2024-08-02

## 2024-08-19 ENCOUNTER — OFFICE VISIT (OUTPATIENT)
Dept: OBGYN CLINIC | Facility: CLINIC | Age: 34
End: 2024-08-19

## 2024-08-19 VITALS
WEIGHT: 146.6 LBS | SYSTOLIC BLOOD PRESSURE: 114 MMHG | HEIGHT: 65 IN | DIASTOLIC BLOOD PRESSURE: 75 MMHG | BODY MASS INDEX: 24.43 KG/M2 | HEART RATE: 77 BPM

## 2024-08-19 DIAGNOSIS — N92.0 MENORRHAGIA WITH REGULAR CYCLE: ICD-10-CM

## 2024-08-19 DIAGNOSIS — D25.1 FIBROIDS, INTRAMURAL: Primary | ICD-10-CM

## 2024-08-19 DIAGNOSIS — N93.9 ABNORMAL UTERINE BLEEDING (AUB): ICD-10-CM

## 2024-08-19 PROCEDURE — 99213 OFFICE O/P EST LOW 20 MIN: CPT | Performed by: OBSTETRICS & GYNECOLOGY

## 2024-08-19 RX ORDER — ACETAMINOPHEN AND CODEINE PHOSPHATE 120; 12 MG/5ML; MG/5ML
1 SOLUTION ORAL DAILY
Qty: 90 TABLET | Refills: 3 | Status: SHIPPED | OUTPATIENT
Start: 2024-08-19

## 2024-08-19 RX ORDER — TRANEXAMIC ACID 650 MG/1
650 TABLET ORAL 3 TIMES DAILY
Qty: 15 TABLET | Refills: 3 | Status: SHIPPED | OUTPATIENT
Start: 2024-08-19 | End: 2024-08-24

## 2024-08-19 NOTE — PROGRESS NOTES
"PROBLEM GYNECOLOGICAL VISIT    Faith Oro is a 34 y.o. female who presents today with complaint of heavy menses.  Her general medical history has been reviewed and she reports it as follows:    No past medical history on file.  Past Surgical History:   Procedure Laterality Date    IR UTERINE ARTERY EMBOLIZATION  6/24/2024     OB History    No obstetric history on file.       Social History     Tobacco Use    Smoking status: Never    Smokeless tobacco: Never   Vaping Use    Vaping status: Never Used   Substance Use Topics    Alcohol use: Never    Drug use: Never     Social History     Substance and Sexual Activity   Sexual Activity Not Currently    Partners: Male    Birth control/protection: OCP       Current Outpatient Medications   Medication Instructions    naproxen (EC NAPROSYN) 500 mg, Oral, 2 times daily with meals    ondansetron (ZOFRAN) 4 mg, Oral, Every 8 hours PRN    oxyCODONE-acetaminophen (Percocet) 5-325 mg per tablet 2 tablets, Oral, Every 6 hours PRN    senna-docusate sodium (SENOKOT S) 8.6-50 mg per tablet 1 tablet, Oral, Daily       History of Present Illness:   Patient presents with c/o 1st menses s/p UAE in June 2024 was very heavy and has been having spotting since her LMP.    Review of Systems:  Review of Systems   Genitourinary:  Positive for menstrual problem.        AUB and menorrhagia   All other systems reviewed and are negative.      Physical Exam:  /75 (BP Location: Left arm, Patient Position: Sitting, Cuff Size: Standard)   Pulse 77   Ht 5' 5\" (1.651 m)   Wt 66.5 kg (146 lb 9.6 oz)   LMP 07/28/2024   BMI 24.40 kg/m²   Physical Exam  Constitutional:       Appearance: Normal appearance.   Neurological:      Mental Status: She is alert.   Vitals reviewed.     Discussion:  Discuss with patient that her menses can still be heavy due to the fibroid which is 6cm and that may experiencing cramping secondary to the UAE.  Recommend TXA and OCP with progesterone and repeat her " pelvic sonogram in December which would be 6mos post her UAE to assess the fibroids size.  Patient agrees with above.    Assessment:   1. Symptomatic fibroid uterus    Plan:   1. TXA/Kary escribed   2. Imaging ordered: pelvic sonogram   3. Return to office 4mos.       Reviewed with patient that test results are [facility] immediately, but that they will not necessarily be reviewed by me immediately.  Explained that I will review results at my earliest opportunity and contact patient appropriately.

## 2024-09-13 RX ORDER — NAPROXEN 500 MG/1
TABLET, DELAYED RELEASE ORAL
Qty: 10 TABLET | Refills: 0 | OUTPATIENT
Start: 2024-09-13

## 2024-09-16 ENCOUNTER — TELEPHONE (OUTPATIENT)
Dept: OBGYN CLINIC | Facility: CLINIC | Age: 34
End: 2024-09-16

## 2024-09-16 NOTE — TELEPHONE ENCOUNTER
Patient called to let provider know she is going to stop taking BC pills    norethindrone (Kary) 0.35 MG tablet [[id number]] because she has not noticed any improvement regarding vaginal bleeding. Patient does not wish to continue using any BC pills at this time. Patient is requesting refills for   Tranexamic Acid 650 MG TABS [[id number]]  ENDED

## 2024-09-17 ENCOUNTER — TELEPHONE (OUTPATIENT)
Dept: OBGYN CLINIC | Facility: MEDICAL CENTER | Age: 34
End: 2024-09-17

## 2024-09-17 DIAGNOSIS — N92.0 MENORRHAGIA WITH REGULAR CYCLE: Primary | ICD-10-CM

## 2024-09-17 RX ORDER — MEDROXYPROGESTERONE ACETATE 10 MG
10 TABLET ORAL 2 TIMES DAILY
Qty: 10 TABLET | Refills: 0 | Status: SHIPPED | OUTPATIENT
Start: 2024-09-17 | End: 2024-09-22

## 2024-09-17 RX ORDER — DROSPIRENONE AND ETHINYL ESTRADIOL 0.02-3(28)
1 KIT ORAL DAILY
Qty: 90 TABLET | Refills: 3 | Status: SHIPPED | OUTPATIENT
Start: 2024-09-17

## 2024-09-17 NOTE — TELEPHONE ENCOUNTER
Called with abnormal bleeding     Had UAE - June     She stopped the birth control (roderick) bc she did not need any longer. And did not want to be on it was only using for pain and she no longer has pain     Then she starting bleeding on 8/20 and cont every day she was given progesterone only pill and did not like it and she keeps bleeding  heavy with clots. (Asymptomatic)     We spoke of the next step    Advised that will be the max benefit of an UAE  in 6 months so we have a few months to see if works if not the only other options is a hysterectomy and she is aware and will consider if this becomes the new norm      Recommend now      Stop the progesterone only pill  Take provera 10 mg bid x 5 days  On day 3 restart the roderick     Advised might have some bleeding breakthrough a bit till the BC get back in to her symptoms  Did tell her that the progesterone might make her  have nausea breast tenderness and gi symptoms but if can fight thorough it .    She is in agreement to try.

## 2024-09-17 NOTE — TELEPHONE ENCOUNTER
Pt called states she stopped the BCP pills because she did not like how she was feeling on them. States the bleeding has not stopped since 8/2024. She is not feeling dizzy, or tired. But she's is having bright red bleeding with clots. Stated she needs this to stop. Sary spoke with pt and will call her back this afternoon with direction.

## 2024-09-23 DIAGNOSIS — D25.1 FIBROIDS, INTRAMURAL: Primary | ICD-10-CM

## 2024-09-23 DIAGNOSIS — N92.0 MENORRHAGIA WITH REGULAR CYCLE: ICD-10-CM

## 2024-09-23 RX ORDER — TRANEXAMIC ACID 650 MG/1
650 TABLET ORAL 3 TIMES DAILY
Qty: 15 TABLET | Refills: 6 | Status: SHIPPED | OUTPATIENT
Start: 2024-09-23 | End: 2024-09-28

## 2024-09-27 RX ORDER — OMEPRAZOLE 40 MG/1
40 CAPSULE, DELAYED RELEASE ORAL
Qty: 30 CAPSULE | Refills: 1 | Status: SHIPPED | OUTPATIENT
Start: 2024-09-27 | End: 2025-03-26

## 2024-11-06 ENCOUNTER — ANNUAL EXAM (OUTPATIENT)
Dept: OBGYN CLINIC | Facility: CLINIC | Age: 34
End: 2024-11-06

## 2024-11-06 VITALS
DIASTOLIC BLOOD PRESSURE: 73 MMHG | SYSTOLIC BLOOD PRESSURE: 112 MMHG | HEART RATE: 89 BPM | WEIGHT: 142.4 LBS | BODY MASS INDEX: 23.7 KG/M2

## 2024-11-06 DIAGNOSIS — Z12.4 SCREENING FOR CERVICAL CANCER: ICD-10-CM

## 2024-11-06 DIAGNOSIS — R31.1 BENIGN ESSENTIAL MICROSCOPIC HEMATURIA: ICD-10-CM

## 2024-11-06 DIAGNOSIS — Z01.419 ROUTINE GYNECOLOGICAL EXAMINATION: Primary | ICD-10-CM

## 2024-11-06 DIAGNOSIS — R32 URINARY INCONTINENCE, UNSPECIFIED TYPE: ICD-10-CM

## 2024-11-06 LAB
SL AMB  POCT GLUCOSE, UA: NEGATIVE
SL AMB LEUKOCYTE ESTERASE,UA: NEGATIVE
SL AMB POCT BILIRUBIN,UA: NEGATIVE
SL AMB POCT BLOOD,UA: ABNORMAL
SL AMB POCT CLARITY,UA: CLEAR
SL AMB POCT COLOR,UA: YELLOW
SL AMB POCT KETONES,UA: NEGATIVE
SL AMB POCT NITRITE,UA: NEGATIVE
SL AMB POCT PH,UA: 7
SL AMB POCT SPECIFIC GRAVITY,UA: 1.01
SL AMB POCT URINE PROTEIN: NEGATIVE
SL AMB POCT UROBILINOGEN: 0.2

## 2024-11-06 PROCEDURE — 87086 URINE CULTURE/COLONY COUNT: CPT | Performed by: OBSTETRICS & GYNECOLOGY

## 2024-11-06 PROCEDURE — 81003 URINALYSIS AUTO W/O SCOPE: CPT | Performed by: OBSTETRICS & GYNECOLOGY

## 2024-11-06 PROCEDURE — G0145 SCR C/V CYTO,THINLAYER,RESCR: HCPCS | Performed by: OBSTETRICS & GYNECOLOGY

## 2024-11-06 PROCEDURE — S0612 ANNUAL GYNECOLOGICAL EXAMINA: HCPCS | Performed by: OBSTETRICS & GYNECOLOGY

## 2024-11-06 RX ORDER — NITROFURANTOIN 25; 75 MG/1; MG/1
100 CAPSULE ORAL 2 TIMES DAILY
Qty: 14 CAPSULE | Refills: 0 | Status: SHIPPED | OUTPATIENT
Start: 2024-11-06 | End: 2024-11-13

## 2024-11-06 NOTE — PROGRESS NOTES
ANNUAL GYNECOLOGICAL EXAMINATION    Faith Oro is a 34 y.o. female who presents today for annual GYN exam.with c/o one episode of leaking urine after getting up.  Her last pap smear was performed 2022 and result was negative.  She reports no history of abnormal pap smears in her past.   She had HIV screening performed 11/08/23 and it was negative.  She reports menses as regular.  Patient's last menstrual period was 10/15/2024 (exact date).  Her general medical history has been reviewed and she reports it as follows:    No past medical history on file.  Past Surgical History:   Procedure Laterality Date    IR UTERINE ARTERY EMBOLIZATION  6/24/2024     OB History    No obstetric history on file.       Social History     Tobacco Use    Smoking status: Never    Smokeless tobacco: Never   Vaping Use    Vaping status: Never Used   Substance Use Topics    Alcohol use: Never    Drug use: Never     Social History     Substance and Sexual Activity   Sexual Activity Yes    Partners: Male    Birth control/protection: OCP     Cancer-related family history is not on file.    Current Outpatient Medications   Medication Instructions    drospirenone-ethinyl estradiol (JOYA) 3-0.02 MG per tablet 1 tablet, Oral, Daily    medroxyPROGESTERone (PROVERA) 10 mg, Oral, 2 times daily    naproxen (EC NAPROSYN) 500 mg, Oral, 2 times daily with meals    norethindrone (SB) 0.35 mg, Oral, Daily    ondansetron (ZOFRAN) 4 mg, Oral, Every 8 hours PRN    oxyCODONE-acetaminophen (Percocet) 5-325 mg per tablet 2 tablets, Oral, Every 6 hours PRN    senna-docusate sodium (SENOKOT S) 8.6-50 mg per tablet 1 tablet, Oral, Daily       Review of Systems:  Review of Systems   Genitourinary:  Negative for pelvic pain, vaginal bleeding and vaginal discharge.        Urinary incontinence   All other systems reviewed and are negative.      Physical Exam:  /73 (BP Location: Right arm, Patient Position: Sitting, Cuff Size: Standard)   Pulse 89   Wt  64.6 kg (142 lb 6.4 oz)   LMP 10/15/2024 (Exact Date)   BMI 23.70 kg/m²   Physical Exam  Constitutional:       Appearance: Normal appearance.   Genitourinary:      Bladder and urethral meatus normal.      No lesions in the vagina.      Right Labia: No rash, tenderness or lesions.     Left Labia: No tenderness, lesions or rash.     No inguinal adenopathy present in the right or left side.     No vaginal discharge.        Right Adnexa: not tender, not full and no mass present.     Left Adnexa: not tender, not full and no mass present.     No cervical motion tenderness, discharge or lesion.      No urethral tenderness or mass present.   HENT:      Head: Normocephalic and atraumatic.   Cardiovascular:      Rate and Rhythm: Normal rate and regular rhythm.   Pulmonary:      Effort: Pulmonary effort is normal.      Breath sounds: Normal breath sounds.   Abdominal:      General: Bowel sounds are normal.      Palpations: Abdomen is soft.      Hernia: There is no hernia in the left inguinal area or right inguinal area.   Musculoskeletal:         General: Normal range of motion.      Cervical back: Normal range of motion and neck supple.   Lymphadenopathy:      Lower Body: No right inguinal adenopathy. No left inguinal adenopathy.   Neurological:      Mental Status: She is alert and oriented to person, place, and time.   Skin:     General: Skin is warm and dry.   Psychiatric:         Mood and Affect: Mood normal.   Vitals reviewed.           Assessment/Plan:   1. Normal well-woman GYN exam.  2. Cervical cancer screening:  Normal cervical exam.  Pap smear done with HPV co-testing.  Has received HPV vaccine in the past.     3. STD screening:  Patient declines.    4. Breast cancer screening:  Normal breast exam.    Reviewed breast self-awareness.   5. Depression Screening: Patient's depression screening was assessed with a PHQ-2 score of 0. Clinically patient does not have depression. No treatment is required.     6. BMI  Counseling: Body mass index is 23.7 kg/m². Discussed the patient's   7. Contraception:  OCP   8. Return to office 1yr/prn.   9. Urinary incontinence: Urine culture   10. UTI: Macrobid escribed    Reviewed with patient that [facility], but that they will not necessarily be reviewed by me immediately.  Explained that I will review results at my earliest opportunity and contact patient appropriately.

## 2024-11-07 LAB — BACTERIA UR CULT: NORMAL

## 2024-11-11 DIAGNOSIS — Z11.3 SCREEN FOR STD (SEXUALLY TRANSMITTED DISEASE): Primary | ICD-10-CM

## 2024-11-11 LAB
LAB AP GYN PRIMARY INTERPRETATION: NORMAL
Lab: NORMAL

## 2024-11-12 ENCOUNTER — OFFICE VISIT (OUTPATIENT)
Dept: PRIMARY CARE | Facility: CLINIC | Age: 34
End: 2024-11-12
Payer: COMMERCIAL

## 2024-11-12 VITALS
HEART RATE: 72 BPM | OXYGEN SATURATION: 98 % | WEIGHT: 140.8 LBS | HEIGHT: 65 IN | DIASTOLIC BLOOD PRESSURE: 85 MMHG | TEMPERATURE: 98 F | SYSTOLIC BLOOD PRESSURE: 127 MMHG | BODY MASS INDEX: 23.46 KG/M2

## 2024-11-12 DIAGNOSIS — Z00.00 ENCOUNTER FOR ANNUAL PHYSICAL EXAM: Primary | ICD-10-CM

## 2024-11-12 DIAGNOSIS — Z11.3 ROUTINE SCREENING FOR STI (SEXUALLY TRANSMITTED INFECTION): ICD-10-CM

## 2024-11-12 DIAGNOSIS — Z23 NEED FOR TDAP VACCINATION: ICD-10-CM

## 2024-11-12 DIAGNOSIS — R30.0 DYSURIA: ICD-10-CM

## 2024-11-12 PROBLEM — K21.9 GERD (GASTROESOPHAGEAL REFLUX DISEASE): Status: ACTIVE | Noted: 2024-11-12

## 2024-11-12 PROCEDURE — 90471 IMMUNIZATION ADMIN: CPT | Performed by: PHYSICIAN ASSISTANT

## 2024-11-12 PROCEDURE — 90715 TDAP VACCINE 7 YRS/> IM: CPT | Performed by: PHYSICIAN ASSISTANT

## 2024-11-12 PROCEDURE — 99395 PREV VISIT EST AGE 18-39: CPT | Mod: 25 | Performed by: PHYSICIAN ASSISTANT

## 2024-11-12 PROCEDURE — 3008F BODY MASS INDEX DOCD: CPT | Performed by: PHYSICIAN ASSISTANT

## 2024-11-12 RX ORDER — DROSPIRENONE AND ETHINYL ESTRADIOL 0.02-3(28)
1 KIT ORAL DAILY
COMMUNITY
Start: 2024-09-17

## 2024-11-12 RX ORDER — NITROFURANTOIN 25; 75 MG/1; MG/1
100 CAPSULE ORAL 2 TIMES DAILY
COMMUNITY
Start: 2024-11-06 | End: 2024-11-12

## 2024-11-12 ASSESSMENT — PAIN SCALES - GENERAL: PAINLEVEL_OUTOF10: 0-NO PAIN

## 2024-11-12 ASSESSMENT — PATIENT HEALTH QUESTIONNAIRE - PHQ9: SUM OF ALL RESPONSES TO PHQ9 QUESTIONS 1 & 2: 0

## 2024-11-12 NOTE — PROGRESS NOTES
EDEN Rowell   [facility] in [place]   [address], [address]  [place] [address]  P: [phone]  F: [phone]         Reason for visit:    Chief Complaint   Patient presents with    Annual Exam       Lazara BERNSTEIN 1990 is a 34 y.o. female   who presents for ANNUAL PHYSICAL.    SUBJECTIVE   Preventive Care   Routine Health Exams:   Up to date:   [x] Vision  [x] Dental  [] Derm/Skin Exam  [x] GYN    Health Maintenance:  Health Maintenance Topics with due status: Overdue       Topic Date Due    HIV Screening Never done    Hepatitis C Screening Never done    Hepatitis B Vaccines Never done    Cervical Cancer Screening Never done     Health Maintenance Topics with due status: Postponed       Topic Postponed Until    Influenza Vaccine 2025 (Originally 2024)    COVID-19 Vaccine 2025 (Originally 2024)     Health Maintenance Topics with due status: Not Due       Topic Last Completion Date    DTaP, Tdap, and Td Vaccines 2024    Depression Screening 2024    Zoster Vaccine Not Due    RSV Vaccine Not Due     Health Maintenance Topics with due status: Aged Out       Topic Date Due    Meningococcal ACWY Aged Out    RSV <20 months Aged Out    HIB Vaccines Aged Out    IPV Vaccines Aged Out    HPV Vaccines Aged Out    Pneumococcal Aged Out       Immunizations:   Immunization History   Administered Date(s) Administered    Influenza Vaccine Quadrivalent Preservative Free 6 Mons and Up 2022, 2023    Influenza, Unspecified 2022    MMR 2014    SARS-COV-2 (COVID19) VACCINE, PFIZER MONOVALENT 2021, 2021    Td 2002    Tdap 2014, 2024     Due tdap, will give today  Declines covid   Declines flu     Cancer Screenings:  Cervical - 2024 - [facility]- Dr Yardlie Toussaint [facility] (GYN)  Mammo - annually, scheduled (mother w Hx breast CA)      Diet:    [x] Balanced  [x] Healthy                []  Pescatarian  [] No red meat  [] Vegetarian  [] Vegan                [] Dairy free  [] Gluten free  [] Diabetic  [] Cardiac              [x] Needs improvement      Exercise:  2 x/week                     [] Cardio  [] Strength  [x] Walking                     [x] Needs improvement     Personal Safety: Feels safe. 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   Education: masters   Children: 0  Pets: dog, Domi   Tobacco:    Social History     Tobacco Use   Smoking Status Never   Smokeless Tobacco Never      Alcohol:   Social History     Substance and Sexual Activity   Alcohol Use Yes    Comment: social      Drugs:    Social History     Substance and Sexual Activity   Drug Use Never      Sexual Health   Sex at birth: female  Gender Identity: female  Sexual Orientation: straight   Relationship Status: bf                                        [x] Monogamous  [] Non-monogamous  Sexual Activity:         [x] Yes  [] No  [] Previously  [] Never  Body Parts Used: [x] Oral  [x] Genital  [] Anal  STI Prevention:  Condoms:  [] Always  [x] Sometimes  [] Never  [] PrEP  Contraception: [] Condom  [x] OCP  [] Ring  [] Implant  [] IUD  [] Depo  [] Patch                               [] Abstinent  [] Menopausal  [] Tubal Ligation  [] Hysterectomy  Pregnancy History:   OB History   No obstetric history on file.         G 0 P 0   LMP: No LMP recorded.  [x] Regular  [] Irregular     Mental Health   Concerns: none   PHQ 2 to 9:  Will the patient answer the depression questions?: Y    Little interest or pleasure in doing things: Not at all    Feeling down, depressed, or  hopeless: Not at all    Depression Risk: 0       Problem List   Patient Active Problem List   Diagnosis Code    GERD (gastroesophageal reflux disease) K21.9    Dysuria R30.0      Allergies   Patient has no known allergies.    Medications     Current Outpatient Medications:     magnesium 200 mg tablet, Take by mouth., Disp: , Rfl:     omeprazole (PriLOSEC) 40 mg capsule, Take 1 capsule (40 mg total) by mouth daily before breakfast., Disp: 30 capsule, Rfl: 1    VESTURA, 28, 3-0.02 mg per tablet, Take 1 tablet by mouth daily., Disp: , Rfl:    Medical History    Past Medical History:  No date: Screening for breast cancer      Comment:  breast ultrasound in 2007/2008 due to mass in right                breast    Surgical History   Past Surgical History   Procedure Laterality Date    Breast biopsy Right 2007/2008    benign per patient report    Breast surgery Right [id number]    right side benign tumor removed (pathology unavailable for review)    Cervical biopsy  w/ loop electrode excision      due to abnormal PAP    Uterine artery embolization  06/24/2024      Family History   Family History   Problem Relation Name Age of Onset    Breast cancer Biological Mother      Hyperlipidemia Biological Father      Hypertension Biological Father      Diabetes Paternal Grandfather        Review of Systems   Constitutional: denies fever, chills   Respiratory: denies shortness of breath  Cardiovascular: denies chest pain, palpitations  Gastrointestinal: denies abdominal pain, n/v/d  Genitourinary: denies dysuria, hematuria  Musculoskeletal: denies joint pain, myalgias  Skin: denies rash   Neurological: denies weakness, numbness  See HPI for abnormal systems and evaluation of them.     OBJECTIVE   Vital Signs   Wt Readings from Last 3 Encounters:   11/12/24 63.9 kg (140 lb 12.8 oz)   07/08/24 65.6 kg (144 lb 9.6 oz)   07/01/24 67 kg (147 lb 9.6 oz)     Temp Readings from Last 3 Encounters:   11/12/24 36.7 °C (98 °F) (Temporal)    07/08/24 36.6 °C (97.8 °F) (Temporal)   07/01/24 36.7 °C (98.1 °F) (Temporal)     BP Readings from Last 3 Encounters:   11/12/24 127/85   07/08/24 122/70   07/01/24 110/72     Pulse Readings from Last 3 Encounters:   11/12/24 72   07/08/24 82   07/01/24 83     Body mass index is 23.8 kg/m².  No LMP recorded.      Physical Exam   Physical Exam  Vitals reviewed.   Constitutional:       General: She is not in acute distress.     Appearance: Normal appearance. She is not toxic-appearing.   HENT:      Head: Normocephalic and atraumatic.      Right Ear: Tympanic membrane, ear canal and external ear normal.      Left Ear: Tympanic membrane, ear canal and external ear normal.      Nose: Nose normal. No rhinorrhea.      Mouth/Throat:      Mouth: Mucous membranes are moist.      Pharynx: Oropharynx is clear. No posterior oropharyngeal erythema.   Eyes:      General: No scleral icterus.     Extraocular Movements: Extraocular movements intact.      Conjunctiva/sclera: Conjunctivae normal.      Pupils: Pupils are equal, round, and reactive to light.   Cardiovascular:      Rate and Rhythm: Normal rate and regular rhythm.      Heart sounds: Normal heart sounds. No murmur heard.  Pulmonary:      Effort: Pulmonary effort is normal.      Breath sounds: Normal breath sounds. No wheezing.   Abdominal:      General: Bowel sounds are normal. There is no distension.      Palpations: Abdomen is soft. There is no mass.      Tenderness: There is no abdominal tenderness.   Musculoskeletal:         General: No swelling or deformity. Normal range of motion.      Cervical back: Normal range of motion and neck supple.   Lymphadenopathy:      Cervical: No cervical adenopathy.   Skin:     General: Skin is warm and dry.      Capillary Refill: Capillary refill takes less than 2 seconds.      Findings: No rash.   Neurological:      General: No focal deficit present.      Mental Status: She is alert and oriented to person, place, and time.    Psychiatric:         Mood and Affect: Mood normal.         Behavior: Behavior normal.           Results   Lab Review:  No visits with results within 2 Month(s) from this visit.   Latest known visit with results is:   Office Visit on 07/08/2024   Component Date Value    Color, UA 07/08/2024 Melissa     Clarity, UA 07/08/2024 Clear     Glucose, UA 07/08/2024 Negative     Bilirubin, UA 07/08/2024 Negative     Ketones, UA 07/08/2024 Negative     Spec Grav, UA 07/08/2024 1.030     Blood, UA 07/08/2024 Positive     pH, UA 07/08/2024 6.0     Protein, UA 07/08/2024 1+     Urobilinogen, UA 07/08/2024 0.2     Leukocytes, UA 07/08/2024 Trace     Nitrite, UA 07/08/2024 Negative     Expiration Date 07/08/2024 5/14/25     Lot Number 07/08/2024 [id number]     POCT  07/08/2024 [facility]     Specific Wyatt 07/08/2024 1.029     pH 07/08/2024 6.0     Urine-Color 07/08/2024 Yellow     Appearance 07/08/2024 Cloudy (A)     WBC Esterase 07/08/2024 1+ (A)     Protein 07/08/2024 2+ (A)     Glucose 07/08/2024 Negative     Ketones 07/08/2024 Trace (A)     Occult Blood 07/08/2024 3+ (A)     Bilirubin 07/08/2024 Negative     Urobilinogen,Semi-Qn 07/08/2024 1.0     Nitrite, Urine 07/08/2024 Negative     Microscopic Examination 07/08/2024 See below:     Urine Culture, Routine 07/08/2024 Final report     WBC 07/08/2024 11-30 (A)     RBC 07/08/2024 11-30 (A)     Epithelial Cells (non re* 07/08/2024 >10 (A)     Casts 07/08/2024 None seen     Bacteria 07/08/2024 Moderate (A)     Result 1 07/08/2024 No growth    .     ASSESSMENT & PLAN   Diagnosis & Treatment   Problem List Items Addressed This Visit       Dysuria    Current Assessment & Plan     - On macrobid from GYN, still sxs   - repeat Ucx today           Other Visit Diagnoses       Encounter for annual physical exam    -  Primary    Relevant Orders    CBC and Differential    Comprehensive metabolic panel    Lipid panel    Hemoglobin A1c    TSH w reflex FT4    HIV 1,2 AB P24 AG     Hepatitis C antibody    Urinalysis with Reflex Culture (ED and Outpatient only)    RPR    Chlamydia/GC RNA:ThinPrep/Urine/Swab    Need for Tdap vaccination        Relevant Orders    Tdap vaccine greater than or equal to 8yo IM (Completed)    Routine screening for STI (sexually transmitted infection)        Relevant Orders    HIV 1,2 AB P24 AG    Hepatitis C antibody    RPR    Chlamydia/GC RNA:ThinPrep/Urine/Swab             Follow-up   Return in about 1 year (around 11/12/2025) for Recheck.     Patient Instructions   - bloodwork and urine at [facility]   - tdap today            EDEN Rowell  11/12/2024    I spent 30 minutes on this date of service performing the following activities: obtaining history, performing examination, entering orders, documenting, obtaining / reviewing records, and providing counseling and education.

## 2024-11-13 LAB
C TRACH RRNA SPEC QL NAA+PROBE: NOT DETECTED
N GONORRHOEA RRNA SPEC QL NAA+PROBE: NOT DETECTED
QST (ALWAYS MESSAGE): NORMAL

## 2024-11-16 LAB
ALBUMIN SERPL-MCNC: 4.4 G/DL (ref 3.6–5.1)
ALBUMIN/GLOB SERPL: 1.4 (CALC) (ref 1–2.5)
ALP SERPL-CCNC: 51 U/L (ref 31–125)
ALT SERPL-CCNC: 7 U/L (ref 6–29)
APPEARANCE UR: CLEAR
AST SERPL-CCNC: 14 U/L (ref 10–30)
BACTERIA #/AREA URNS HPF: ABNORMAL /HPF
BACTERIA UR CULT: NORMAL
BACTERIA UR CULT: NORMAL
BASOPHILS # BLD AUTO: 22 CELLS/UL (ref 0–200)
BASOPHILS NFR BLD AUTO: 0.5 %
BILIRUB SERPL-MCNC: 0.6 MG/DL (ref 0.2–1.2)
BILIRUB UR QL STRIP: NEGATIVE
BUN SERPL-MCNC: 6 MG/DL (ref 7–25)
BUN/CREAT SERPL: 9 (CALC) (ref 6–22)
CALCIUM SERPL-MCNC: 9.7 MG/DL (ref 8.6–10.2)
CHLORIDE SERPL-SCNC: 101 MMOL/L (ref 98–110)
CHOLEST SERPL-MCNC: 208 MG/DL
CHOLEST/HDLC SERPL: 3.3 (CALC)
CO2 SERPL-SCNC: 28 MMOL/L (ref 20–32)
COLOR UR: YELLOW
CREAT SERPL-MCNC: 0.69 MG/DL (ref 0.5–0.97)
EGFRCR SERPLBLD CKD-EPI 2021: 115 ML/MIN/1.73M2
EOSINOPHIL # BLD AUTO: 9 CELLS/UL (ref 15–500)
EOSINOPHIL NFR BLD AUTO: 0.2 %
ERYTHROCYTE [DISTWIDTH] IN BLOOD BY AUTOMATED COUNT: 11.5 % (ref 11–15)
GLOBULIN SER CALC-MCNC: 3.1 G/DL (CALC) (ref 1.9–3.7)
GLUCOSE SERPL-MCNC: 101 MG/DL (ref 65–139)
GLUCOSE UR QL STRIP: NEGATIVE
HBA1C MFR BLD: 5.3 % OF TOTAL HGB
HCT VFR BLD AUTO: 35.1 % (ref 35–45)
HCV AB SERPL QL IA: NORMAL
HDLC SERPL-MCNC: 64 MG/DL
HGB BLD-MCNC: 12 G/DL (ref 11.7–15.5)
HGB UR QL STRIP: ABNORMAL
HIV 1+2 AB+HIV1 P24 AG SERPL QL IA: NORMAL
HYALINE CASTS #/AREA URNS LPF: ABNORMAL /LPF
KETONES UR QL STRIP: NEGATIVE
LDLC SERPL CALC-MCNC: 127 MG/DL (CALC)
LEUKOCYTE ESTERASE UR QL STRIP: ABNORMAL
LYMPHOCYTES # BLD AUTO: 1540 CELLS/UL (ref 850–3900)
LYMPHOCYTES NFR BLD AUTO: 35 %
MCH RBC QN AUTO: 31.4 PG (ref 27–33)
MCHC RBC AUTO-ENTMCNC: 34.2 G/DL (ref 32–36)
MCV RBC AUTO: 91.9 FL (ref 80–100)
MONOCYTES # BLD AUTO: 198 CELLS/UL (ref 200–950)
MONOCYTES NFR BLD AUTO: 4.5 %
NEUTROPHILS # BLD AUTO: 2631 CELLS/UL (ref 1500–7800)
NEUTROPHILS NFR BLD AUTO: 59.8 %
NITRITE UR QL STRIP: NEGATIVE
NONHDLC SERPL-MCNC: 144 MG/DL (CALC)
PH UR STRIP: 6 [PH] (ref 5–8)
PLATELET # BLD AUTO: 328 THOUSAND/UL (ref 140–400)
PMV BLD REES-ECKER: 10.1 FL (ref 7.5–12.5)
POTASSIUM SERPL-SCNC: 3.7 MMOL/L (ref 3.5–5.3)
PROT SERPL-MCNC: 7.5 G/DL (ref 6.1–8.1)
PROT UR QL STRIP: ABNORMAL
RBC # BLD AUTO: 3.82 MILLION/UL (ref 3.8–5.1)
RBC #/AREA URNS HPF: ABNORMAL /HPF
RPR SER QL: NORMAL
SERVICE CMNT-IMP: ABNORMAL
SODIUM SERPL-SCNC: 137 MMOL/L (ref 135–146)
SP GR UR STRIP: 1.02 (ref 1–1.03)
SQUAMOUS #/AREA URNS HPF: ABNORMAL /HPF
T4 FREE SERPL-MCNC: 1.1 NG/DL (ref 0.8–1.8)
TRIGL SERPL-MCNC: 80 MG/DL
TSH SERPL-ACNC: 0.15 MIU/L
WBC # BLD AUTO: 4.4 THOUSAND/UL (ref 3.8–10.8)
WBC #/AREA URNS HPF: ABNORMAL /HPF

## 2024-11-21 ENCOUNTER — TELEPHONE (OUTPATIENT)
Dept: OBGYN CLINIC | Facility: CLINIC | Age: 34
End: 2024-11-21

## 2024-11-21 ENCOUNTER — TELEPHONE (OUTPATIENT)
Dept: PRIMARY CARE | Facility: CLINIC | Age: 34
End: 2024-11-21

## 2024-11-21 NOTE — TELEPHONE ENCOUNTER
[facility] Appointment Request   Provider: Richard  Appointment Type: Office Visit  Reason for Visit: patient is still having the same symptoms after taking medicaiton from last visit. Leakage and frequent urination. Patient requesting for visit on   Available Day and Time: 12/03 around 9am/10am1p/2pm  Best Contact Number: [phone]    The practice will reach out to schedule your appointment within the next 2 business days.

## 2024-11-21 NOTE — TELEPHONE ENCOUNTER
Patient called stated took medication that was given and feels it has not helped. Patient wants to know if she needs to come in to be reevaluated.

## 2024-11-25 ENCOUNTER — APPOINTMENT (OUTPATIENT)
Dept: LAB | Facility: CLINIC | Age: 34
End: 2024-11-25
Payer: COMMERCIAL

## 2024-11-25 ENCOUNTER — OFFICE VISIT (OUTPATIENT)
Dept: OBGYN CLINIC | Facility: CLINIC | Age: 34
End: 2024-11-25

## 2024-11-25 VITALS
HEART RATE: 78 BPM | SYSTOLIC BLOOD PRESSURE: 121 MMHG | WEIGHT: 143.8 LBS | HEIGHT: 65 IN | DIASTOLIC BLOOD PRESSURE: 76 MMHG | BODY MASS INDEX: 23.96 KG/M2

## 2024-11-25 DIAGNOSIS — R39.81 FUNCTIONAL URINARY INCONTINENCE: Primary | ICD-10-CM

## 2024-11-25 DIAGNOSIS — N81.4 CYSTOCELE WITH UTERINE PROLAPSE: ICD-10-CM

## 2024-11-25 DIAGNOSIS — Z11.3 SCREEN FOR STD (SEXUALLY TRANSMITTED DISEASE): ICD-10-CM

## 2024-11-25 DIAGNOSIS — N93.9 ABNORMAL UTERINE BLEEDING (AUB): ICD-10-CM

## 2024-11-25 LAB
T4 FREE SERPL-MCNC: 0.73 NG/DL (ref 0.61–1.12)
T4 SERPL-MCNC: 8.31 UG/DL (ref 6.09–12.23)
TREPONEMA PALLIDUM IGG+IGM AB [PRESENCE] IN SERUM OR PLASMA BY IMMUNOASSAY: NORMAL

## 2024-11-25 PROCEDURE — 86706 HEP B SURFACE ANTIBODY: CPT

## 2024-11-25 PROCEDURE — 36415 COLL VENOUS BLD VENIPUNCTURE: CPT

## 2024-11-25 PROCEDURE — 86780 TREPONEMA PALLIDUM: CPT

## 2024-11-25 PROCEDURE — 86696 HERPES SIMPLEX TYPE 2 TEST: CPT

## 2024-11-25 PROCEDURE — 86803 HEPATITIS C AB TEST: CPT

## 2024-11-25 PROCEDURE — 86695 HERPES SIMPLEX TYPE 1 TEST: CPT

## 2024-11-25 PROCEDURE — 87389 HIV-1 AG W/HIV-1&-2 AB AG IA: CPT

## 2024-11-25 PROCEDURE — 99213 OFFICE O/P EST LOW 20 MIN: CPT | Performed by: OBSTETRICS & GYNECOLOGY

## 2024-11-25 PROCEDURE — 84439 ASSAY OF FREE THYROXINE: CPT

## 2024-11-25 RX ORDER — TOLTERODINE 4 MG/1
4 CAPSULE, EXTENDED RELEASE ORAL DAILY
Qty: 30 CAPSULE | Refills: 11 | Status: SHIPPED | OUTPATIENT
Start: 2024-11-25

## 2024-11-25 NOTE — PROGRESS NOTES
"PROBLEM GYNECOLOGICAL VISIT    Faith Oro is a 34 y.o. female who presents today with complaint of urinary incontinence.  Her general medical history has been reviewed and she reports it as follows:    No past medical history on file.  Past Surgical History:   Procedure Laterality Date    IR UTERINE ARTERY EMBOLIZATION  6/24/2024     OB History    No obstetric history on file.       Social History     Tobacco Use    Smoking status: Never    Smokeless tobacco: Never   Vaping Use    Vaping status: Never Used   Substance Use Topics    Alcohol use: Never    Drug use: Never     Social History     Substance and Sexual Activity   Sexual Activity Yes    Partners: Male    Birth control/protection: OCP       Current Outpatient Medications   Medication Instructions    drospirenone-ethinyl estradiol (JOYA) 3-0.02 MG per tablet 1 tablet, Oral, Daily    medroxyPROGESTERone (PROVERA) 10 mg, Oral, 2 times daily    naproxen (EC NAPROSYN) 500 mg, Oral, 2 times daily with meals    norethindrone (SB) 0.35 mg, Oral, Daily    ondansetron (ZOFRAN) 4 mg, Oral, Every 8 hours PRN    oxyCODONE-acetaminophen (Percocet) 5-325 mg per tablet 2 tablets, Oral, Every 6 hours PRN    senna-docusate sodium (SENOKOT S) 8.6-50 mg per tablet 1 tablet, Oral, Daily       History of Present Illness:   Patient presents with c/o leaking urine off and on since her last visit.    Review of Systems:  Review of Systems   Genitourinary:  Negative for menstrual problem, pelvic pain, vaginal bleeding and vaginal discharge.        Urinary incontinence   All other systems reviewed and are negative.      Physical Exam:  /76 (BP Location: Left arm, Patient Position: Sitting, Cuff Size: Standard)   Pulse 78   Ht 5' 5\" (1.651 m)   Wt 65.2 kg (143 lb 12.8 oz)   LMP 11/12/2024 (Exact Date)   BMI 23.93 kg/m²   Physical Exam  Constitutional:       Appearance: Normal appearance.   Genitourinary:      Bladder and urethral meatus normal.      No lesions in the " vagina.      Right Labia: No rash, tenderness or lesions.     Left Labia: No tenderness, lesions or rash.     No vaginal discharge.      No cervical motion tenderness.      Uterus is enlarged.      Uterus is not tender.      Uterine mass present.     Uterus exam comments: prolapse.      No urethral tenderness or mass present.   Neurological:      Mental Status: She is alert.   Vitals reviewed.     Discussion:  Discuss with patient the leaking of urine is due to her uterine prolapse with the cervix pulling down the anterior vaginal wall which contains the bladder.  Recommend medical management and if that does not help will most likely need a pessary ( mechanical management).  Patient agrees to try Detrol which will be prescribed.    Assessment:   1. Urinary incontinence   2. Uterine prolapse    Plan:   1. Detrol La 4mg   2. Return to office keep up coming follow up examination.       Reviewed with patient that test results are available in [facility] immediately, but that they will not necessarily be reviewed by me immediately.  Explained that I will review results at my earliest opportunity and contact patient appropriately.

## 2024-11-26 LAB
C TRACH DNA SPEC QL NAA+PROBE: NEGATIVE
HBV SURFACE AB SER-ACNC: 87.4 MIU/ML
HCV AB SER QL: NORMAL
HIV 1+2 AB+HIV1 P24 AG SERPL QL IA: NORMAL
HIV 2 AB SERPL QL IA: NORMAL
HIV1 AB SERPL QL IA: NORMAL
HIV1 P24 AG SERPL QL IA: NORMAL
HSV2 IGG SERPL QL IA: POSITIVE
HSV2 IGG SERPL QL IA: POSITIVE
N GONORRHOEA DNA SPEC QL NAA+PROBE: NEGATIVE

## 2024-12-05 ENCOUNTER — TELEPHONE (OUTPATIENT)
Dept: OBGYN CLINIC | Facility: CLINIC | Age: 34
End: 2024-12-05

## 2024-12-11 NOTE — TELEPHONE ENCOUNTER
Spoke with pt states they told her she has high density in her breasts and needs an MRI. I asked pt to have results faxed over so they can be reviewed. States understanding

## 2024-12-16 ENCOUNTER — HOSPITAL ENCOUNTER (OUTPATIENT)
Dept: ULTRASOUND IMAGING | Facility: HOSPITAL | Age: 34
Discharge: HOME | End: 2024-12-16
Payer: COMMERCIAL

## 2024-12-16 DIAGNOSIS — D25.1 FIBROIDS, INTRAMURAL: ICD-10-CM

## 2024-12-16 DIAGNOSIS — N93.9 ABNORMAL UTERINE BLEEDING (AUB): ICD-10-CM

## 2024-12-16 DIAGNOSIS — N92.0 MENORRHAGIA WITH REGULAR CYCLE: ICD-10-CM

## 2024-12-16 PROCEDURE — 76856 US EXAM PELVIC COMPLETE: CPT

## 2024-12-16 PROCEDURE — 76830 TRANSVAGINAL US NON-OB: CPT

## 2024-12-23 ENCOUNTER — OFFICE VISIT (OUTPATIENT)
Dept: OBGYN CLINIC | Facility: CLINIC | Age: 34
End: 2024-12-23

## 2024-12-23 VITALS
SYSTOLIC BLOOD PRESSURE: 131 MMHG | WEIGHT: 144.2 LBS | HEART RATE: 83 BPM | BODY MASS INDEX: 24 KG/M2 | DIASTOLIC BLOOD PRESSURE: 77 MMHG

## 2024-12-23 DIAGNOSIS — N81.4 CYSTOCELE WITH UTERINE PROLAPSE: Primary | ICD-10-CM

## 2024-12-23 PROCEDURE — 99213 OFFICE O/P EST LOW 20 MIN: CPT | Performed by: OBSTETRICS & GYNECOLOGY

## 2024-12-23 NOTE — PROGRESS NOTES
PROBLEM GYNECOLOGICAL VISIT    Faith Oro is a 34 y.o. female who presents today for follow up.  Her general medical history has been reviewed and she reports it as follows:    No past medical history on file.  Past Surgical History:   Procedure Laterality Date    IR UTERINE ARTERY EMBOLIZATION  6/24/2024     OB History    No obstetric history on file.       Social History     Tobacco Use    Smoking status: Never    Smokeless tobacco: Never   Vaping Use    Vaping status: Never Used   Substance Use Topics    Alcohol use: Never    Drug use: Never     Social History     Substance and Sexual Activity   Sexual Activity Yes    Partners: Male    Birth control/protection: OCP       Current Outpatient Medications   Medication Instructions    drospirenone-ethinyl estradiol (JOYA) 3-0.02 MG per tablet 1 tablet, Oral, Daily    medroxyPROGESTERone (PROVERA) 10 mg, Oral, 2 times daily    naproxen (EC NAPROSYN) 500 mg, Oral, 2 times daily with meals    norethindrone (SB) 0.35 mg, Oral, Daily    ondansetron (ZOFRAN) 4 mg, Oral, Every 8 hours PRN    oxyCODONE-acetaminophen (Percocet) 5-325 mg per tablet 2 tablets, Oral, Every 6 hours PRN    senna-docusate sodium (SENOKOT S) 8.6-50 mg per tablet 1 tablet, Oral, Daily    tolterodine (DETROL LA) 4 mg, Oral, Daily       History of Present Illness:   Patient presents for follow up s/p trial of detrol la with c/o not taking the medication on regular basis but will from now on.    Review of Systems:  Review of Systems   Genitourinary:  Negative for pelvic pain, vaginal bleeding and vaginal discharge.        Nocturia   All other systems reviewed and are negative.      Physical Exam:  /77 (BP Location: Left arm, Patient Position: Sitting, Cuff Size: Standard)   Pulse 83   Wt 65.4 kg (144 lb 3.2 oz)   LMP 12/09/2024 (Exact Date)   BMI 24.00 kg/m²   Physical Exam  Constitutional:       Appearance: Normal appearance.   Neurological:      Mental Status: She is alert.   Vitals  and nursing note reviewed.           Assessment:   1. Urinary incontinence/Nocturia    Plan:   1. Continue with detrol la   2. Return to office 6wks.   3. Patient's depression screening was assessed with a PHQ-2 score of 0. Clinically patient does not have depression. No treatment is required.      Reviewed with patient that [facility], but that they will not necessarily be reviewed by me immediately.  Explained that I will review results at my earliest opportunity and contact patient appropriately.

## 2025-01-15 ENCOUNTER — TELEPHONE (OUTPATIENT)
Dept: OBGYN CLINIC | Facility: CLINIC | Age: 35
End: 2025-01-15

## 2025-01-28 ENCOUNTER — TELEPHONE (OUTPATIENT)
Dept: OBGYN CLINIC | Facility: CLINIC | Age: 35
End: 2025-01-28

## 2025-01-28 DIAGNOSIS — N92.0 MENORRHAGIA WITH REGULAR CYCLE: ICD-10-CM

## 2025-01-28 NOTE — TELEPHONE ENCOUNTER
Patient called for refill of birth control pills vestura to be sent   [id number] - , [place] - [address]  [address], [place] [address]  Phone: [phone]  Fax: [phone]  LAMONTE #: [id number]

## 2025-01-29 DIAGNOSIS — N92.0 MENORRHAGIA WITH REGULAR CYCLE: ICD-10-CM

## 2025-01-29 RX ORDER — DROSPIRENONE AND ETHINYL ESTRADIOL 0.02-3(28)
1 KIT ORAL DAILY
Qty: 90 TABLET | Refills: 3 | Status: SHIPPED | OUTPATIENT
Start: 2025-01-29

## 2025-01-30 RX ORDER — DROSPIRENONE AND ETHINYL ESTRADIOL 0.02-3(28)
1 KIT ORAL DAILY
Qty: 28 TABLET | OUTPATIENT
Start: 2025-01-30

## 2025-03-07 ENCOUNTER — TELEPHONE (OUTPATIENT)
Dept: PRIMARY CARE | Facility: CLINIC | Age: 35
End: 2025-03-07
Payer: COMMERCIAL

## 2025-03-07 NOTE — TELEPHONE ENCOUNTER
Call from pt, she needs proof that she had TB test. She would like this information sent to portal.

## 2025-03-11 ENCOUNTER — TELEPHONE (OUTPATIENT)
Dept: PRIMARY CARE | Facility: CLINIC | Age: 35
End: 2025-03-11
Payer: COMMERCIAL

## 2025-03-11 NOTE — TELEPHONE ENCOUNTER
Left voicemail for patient to schedule a diagnostic appointment. Per the lead MA, patient will be scheduled on a Tuesday for TB test.